# Patient Record
Sex: FEMALE | Race: WHITE | NOT HISPANIC OR LATINO | URBAN - METROPOLITAN AREA
[De-identification: names, ages, dates, MRNs, and addresses within clinical notes are randomized per-mention and may not be internally consistent; named-entity substitution may affect disease eponyms.]

---

## 2018-12-12 ENCOUNTER — OFFICE VISIT (OUTPATIENT)
Dept: FAMILY MEDICINE | Facility: CLINIC | Age: 33
End: 2018-12-12
Payer: COMMERCIAL

## 2018-12-12 VITALS
TEMPERATURE: 98.1 F | WEIGHT: 179 LBS | DIASTOLIC BLOOD PRESSURE: 80 MMHG | BODY MASS INDEX: 30.56 KG/M2 | RESPIRATION RATE: 18 BRPM | OXYGEN SATURATION: 98 % | SYSTOLIC BLOOD PRESSURE: 120 MMHG | HEIGHT: 64 IN | HEART RATE: 72 BPM

## 2018-12-12 DIAGNOSIS — K21.9 GASTROESOPHAGEAL REFLUX DISEASE, ESOPHAGITIS PRESENCE NOT SPECIFIED: ICD-10-CM

## 2018-12-12 DIAGNOSIS — Z00.00 PHYSICAL EXAM: Primary | ICD-10-CM

## 2018-12-12 PROCEDURE — 99385 PREV VISIT NEW AGE 18-39: CPT | Performed by: FAMILY MEDICINE

## 2018-12-12 RX ORDER — OMEPRAZOLE 20 MG/1
20 CAPSULE, DELAYED RELEASE ORAL
COMMUNITY
End: 2019-03-25

## 2018-12-12 ASSESSMENT — ENCOUNTER SYMPTOMS
NUMBNESS: 0
PHOTOPHOBIA: 0
DYSURIA: 0
NERVOUS/ANXIOUS: 0
CHEST TIGHTNESS: 0
NECK PAIN: 0
NAUSEA: 0
SINUS PAIN: 0
JOINT SWELLING: 0
WHEEZING: 0
LIGHT-HEADEDNESS: 0
HEMATURIA: 0
TROUBLE SWALLOWING: 0
PALPITATIONS: 0
FATIGUE: 0
DIFFICULTY URINATING: 0
EYE DISCHARGE: 0
SLEEP DISTURBANCE: 0
ACTIVITY CHANGE: 0
VOICE CHANGE: 0
EYE REDNESS: 0
ABDOMINAL PAIN: 0
UNEXPECTED WEIGHT CHANGE: 0
ROS GI COMMENTS: HEARTBURN
VOMITING: 0
APNEA: 0
FREQUENCY: 0
EYE PAIN: 0
ABDOMINAL DISTENTION: 0
DIZZINESS: 0
APPETITE CHANGE: 0
CONSTIPATION: 0
WEAKNESS: 0
HEADACHES: 0
BACK PAIN: 0
SHORTNESS OF BREATH: 0
RHINORRHEA: 0
DIARRHEA: 0
SORE THROAT: 0
COUGH: 0
BLOOD IN STOOL: 0

## 2018-12-12 NOTE — PROGRESS NOTES
"Subjective      Patient ID: Fatoumata De Los Santos is a 33 y.o. female.  1985      HPI  Pt presents for physical exam  Having issues with heartburn after having baby 2 months taking omeprazole  Nursing baby  Has history of anxiety was on Paxil previously  Off since 2015  Sees Gyn had pap 2018  The following have been reviewed and updated as appropriate in this visit:  Tobacco  Allergies  Meds  Problems  Med Hx  Surg Hx  Fam Hx       Review of Systems   Constitutional: Negative for activity change, appetite change, fatigue and unexpected weight change.   HENT: Negative for congestion, ear pain, hearing loss, rhinorrhea, sinus pain, sneezing, sore throat, trouble swallowing and voice change.    Eyes: Negative for photophobia, pain, discharge, redness and visual disturbance.   Respiratory: Negative for apnea, cough, chest tightness, shortness of breath and wheezing.    Cardiovascular: Negative for chest pain, palpitations and leg swelling.   Gastrointestinal: Negative for abdominal distention, abdominal pain, blood in stool, constipation, diarrhea, nausea and vomiting.        Heartburn     Endocrine: Negative for cold intolerance and heat intolerance.   Genitourinary: Negative for decreased urine volume, difficulty urinating, dysuria, frequency, hematuria, urgency, vaginal bleeding, vaginal discharge and vaginal pain.   Musculoskeletal: Negative for back pain, joint swelling and neck pain.   Skin: Negative for rash.   Neurological: Negative for dizziness, weakness, light-headedness, numbness and headaches.   Psychiatric/Behavioral: Negative for sleep disturbance. The patient is not nervous/anxious.        Objective     Vitals:    12/12/18 1141 12/12/18 1201   BP: 110/70 120/80   BP Location: Left upper arm    Patient Position: Sitting    Pulse: 72    Resp: 18    Temp: 36.7 °C (98.1 °F)    TempSrc: Oral    SpO2: 98%    Weight: 81.2 kg (179 lb)    Height: 1.632 m (5' 4.25\")      Body mass index is 30.49 " kg/m².    Physical Exam   Constitutional: She is oriented to person, place, and time. She appears well-developed and well-nourished.   HENT:   Head: Normocephalic.   Right Ear: Tympanic membrane, external ear and ear canal normal.   Left Ear: Tympanic membrane, external ear and ear canal normal.   Nose: Nose normal.   Eyes: Conjunctivae and EOM are normal. Pupils are equal, round, and reactive to light.   Neck: No JVD present.   Cardiovascular: Normal rate and regular rhythm.    No murmur heard.  Pulmonary/Chest: Effort normal and breath sounds normal. No respiratory distress. She has no wheezes.   Abdominal: Soft. Bowel sounds are normal. She exhibits no distension and no mass. There is no tenderness. There is no rebound and no guarding. No hernia.   Musculoskeletal: She exhibits no edema or tenderness.   Lymphadenopathy:     She has no cervical adenopathy.   Neurological: She is alert and oriented to person, place, and time. She displays normal reflexes. No sensory deficit.   Skin: Skin is warm and dry.   Psychiatric: She has a normal mood and affect.   Nursing note and vitals reviewed.      Assessment/Plan   Problem List Items Addressed This Visit     None      Visit Diagnoses     Physical exam    -  Primary    diet/exercise  up to date on Pap  check labs    Relevant Orders    Lipid panel    TSH w reflex FT4    Comprehensive metabolic panel    CBC and Differential    Gastroesophageal reflux disease, esophagitis presence not specified        omeprazole and monitor    Relevant Medications    omeprazole (PriLOSEC) 20 mg capsule          Current Outpatient Prescriptions:   •  omeprazole (PriLOSEC) 20 mg capsule, Take 20 mg by mouth daily before breakfast., Disp: , Rfl:   •  prenatal vits96/iron fum/folic (PRE-THIAGO VITAMIN) 27 mg iron- 800 mcg tablet, Take by mouth daily., Disp: , Rfl:   Return in about 1 year (around 2019) for PE.  PVU

## 2018-12-25 LAB
ALBUMIN SERPL-MCNC: 4.4 G/DL (ref 3.6–5.1)
ALBUMIN/GLOB SERPL: 1.5 (CALC) (ref 1–2.5)
ALP SERPL-CCNC: 66 U/L (ref 33–115)
ALT SERPL-CCNC: 33 U/L (ref 6–29)
AST SERPL-CCNC: 22 U/L (ref 10–30)
BASOPHILS # BLD AUTO: 41 CELLS/UL (ref 0–200)
BASOPHILS NFR BLD AUTO: 0.8 %
BILIRUB SERPL-MCNC: 0.6 MG/DL (ref 0.2–1.2)
BUN SERPL-MCNC: 18 MG/DL (ref 7–25)
BUN/CREAT SERPL: ABNORMAL (CALC) (ref 6–22)
CALCIUM SERPL-MCNC: 9.1 MG/DL (ref 8.6–10.2)
CHLORIDE SERPL-SCNC: 107 MMOL/L (ref 98–110)
CHOLEST SERPL-MCNC: 231 MG/DL
CHOLEST/HDLC SERPL: 4 (CALC)
CO2 SERPL-SCNC: 26 MMOL/L (ref 20–32)
CREAT SERPL-MCNC: 0.65 MG/DL (ref 0.5–1.1)
EOSINOPHIL # BLD AUTO: 153 CELLS/UL (ref 15–500)
EOSINOPHIL NFR BLD AUTO: 3 %
ERYTHROCYTE [DISTWIDTH] IN BLOOD BY AUTOMATED COUNT: 13.5 % (ref 11–15)
GFR SERPL CREATININE-BSD FRML MDRD: 117 ML/MIN/1.73M2
GLOBULIN SER CALC-MCNC: 3 G/DL (CALC) (ref 1.9–3.7)
GLUCOSE SERPL-MCNC: 78 MG/DL (ref 65–99)
HCT VFR BLD AUTO: 41.4 % (ref 35–45)
HDLC SERPL-MCNC: 58 MG/DL
HGB BLD-MCNC: 14.2 G/DL (ref 11.7–15.5)
LDLC SERPL CALC-MCNC: 153 MG/DL (CALC)
LYMPHOCYTES # BLD AUTO: 1795 CELLS/UL (ref 850–3900)
LYMPHOCYTES NFR BLD AUTO: 35.2 %
MCH RBC QN AUTO: 29.8 PG (ref 27–33)
MCHC RBC AUTO-ENTMCNC: 34.3 G/DL (ref 32–36)
MCV RBC AUTO: 86.8 FL (ref 80–100)
MONOCYTES # BLD AUTO: 332 CELLS/UL (ref 200–950)
MONOCYTES NFR BLD AUTO: 6.5 %
NEUTROPHILS # BLD AUTO: 2780 CELLS/UL (ref 1500–7800)
NEUTROPHILS NFR BLD AUTO: 54.5 %
NONHDLC SERPL-MCNC: 173 MG/DL (CALC)
PLATELET # BLD AUTO: 262 THOUSAND/UL (ref 140–400)
PMV BLD REES-ECKER: 8.9 FL (ref 7.5–12.5)
POTASSIUM SERPL-SCNC: 4.2 MMOL/L (ref 3.5–5.3)
PROT SERPL-MCNC: 7.4 G/DL (ref 6.1–8.1)
RBC # BLD AUTO: 4.77 MILLION/UL (ref 3.8–5.1)
SODIUM SERPL-SCNC: 141 MMOL/L (ref 135–146)
TRIGL SERPL-MCNC: 92 MG/DL
TSH SERPL-ACNC: 0.79 MIU/L
WBC # BLD AUTO: 5.1 THOUSAND/UL (ref 3.8–10.8)

## 2019-03-25 ENCOUNTER — OFFICE VISIT (OUTPATIENT)
Dept: FAMILY MEDICINE | Facility: CLINIC | Age: 34
End: 2019-03-25
Payer: COMMERCIAL

## 2019-03-25 VITALS
BODY MASS INDEX: 28.27 KG/M2 | TEMPERATURE: 98.6 F | HEART RATE: 81 BPM | WEIGHT: 166 LBS | DIASTOLIC BLOOD PRESSURE: 60 MMHG | SYSTOLIC BLOOD PRESSURE: 98 MMHG | OXYGEN SATURATION: 98 % | RESPIRATION RATE: 20 BRPM

## 2019-03-25 DIAGNOSIS — R10.13 EPIGASTRIC PAIN: Primary | ICD-10-CM

## 2019-03-25 DIAGNOSIS — R11.0 NAUSEA: ICD-10-CM

## 2019-03-25 PROCEDURE — 99213 OFFICE O/P EST LOW 20 MIN: CPT | Performed by: FAMILY MEDICINE

## 2019-03-25 ASSESSMENT — ENCOUNTER SYMPTOMS
HEADACHES: 0
CONSTIPATION: 0
COUGH: 0
ABDOMINAL PAIN: 1
SINUS PRESSURE: 0
DIARRHEA: 0
DIZZINESS: 0
FEVER: 0
ABDOMINAL DISTENTION: 0
VOMITING: 0
SORE THROAT: 0
NAUSEA: 1
WHEEZING: 0
TROUBLE SWALLOWING: 0
SINUS PAIN: 0
SHORTNESS OF BREATH: 0
FATIGUE: 0
ACTIVITY CHANGE: 0
RHINORRHEA: 0

## 2019-03-25 NOTE — PROGRESS NOTES
Subjective      Patient ID: Fatoumata De Los Santos is a 33 y.o. female.  1985      Abdominal Pain   This is a new problem. The current episode started more than 1 month ago. The problem occurs intermittently. The pain is located in the epigastric region and RUQ. Pain radiation: mid upper thoracic. Associated symptoms include nausea. Pertinent negatives include no constipation, diarrhea, fever, headaches or vomiting. Nothing aggravates the pain. The pain is relieved by nothing. Treatments tried: omeprazole. The treatment provided no relief.   stopped omeprazole because it was not helping  The following have been reviewed and updated as appropriate in this visit:  Allergies  Meds  Problems       Review of Systems   Constitutional: Negative for activity change, fatigue and fever.   HENT: Negative for congestion, ear pain, postnasal drip, rhinorrhea, sinus pain, sinus pressure, sore throat and trouble swallowing.    Respiratory: Negative for cough, shortness of breath and wheezing.    Cardiovascular: Negative for chest pain.   Gastrointestinal: Positive for abdominal pain and nausea. Negative for abdominal distention, constipation, diarrhea and vomiting.   Neurological: Negative for dizziness and headaches.       Objective     Vitals:    03/25/19 0813   BP: 98/60   BP Location: Left upper arm   Patient Position: Sitting   Pulse: 81   Resp: 20   Temp: 37 °C (98.6 °F)   TempSrc: Oral   SpO2: 98%   Weight: 75.3 kg (166 lb)     Body mass index is 28.27 kg/m².    Physical Exam   Constitutional: She is oriented to person, place, and time. She appears well-developed and well-nourished.   Cardiovascular: Normal rate and regular rhythm.    No murmur heard.  Pulmonary/Chest: Effort normal. She has no wheezes.   Abdominal: Soft. There is tenderness in the epigastric area. There is no rebound and no guarding.   Neurological: She is alert and oriented to person, place, and time.   Psychiatric: She has a normal mood and affect.    Nursing note and vitals reviewed.      Assessment/Plan   Diagnoses and all orders for this visit:    Epigastric pain (Primary)  Comments:  check US if negative and symptoms persist check HIDA  Orders:  -     ULTRASOUND ABDOMEN COMPLETE; Future    Nausea  Comments:  check US if negative and symptoms persist check HIDA  Orders:  -     ULTRASOUND ABDOMEN COMPLETE; Future    await US  No Follow-up on file.  PVU

## 2019-04-05 ENCOUNTER — HOSPITAL ENCOUNTER (OUTPATIENT)
Dept: RADIOLOGY | Age: 34
Discharge: HOME | End: 2019-04-05
Attending: FAMILY MEDICINE
Payer: COMMERCIAL

## 2019-04-05 DIAGNOSIS — R10.13 EPIGASTRIC PAIN: ICD-10-CM

## 2019-04-05 DIAGNOSIS — R11.0 NAUSEA: ICD-10-CM

## 2019-04-05 PROCEDURE — 76700 US EXAM ABDOM COMPLETE: CPT

## 2019-05-13 ENCOUNTER — OFFICE VISIT (OUTPATIENT)
Dept: FAMILY MEDICINE | Facility: CLINIC | Age: 34
End: 2019-05-13
Payer: COMMERCIAL

## 2019-05-13 VITALS
RESPIRATION RATE: 16 BRPM | HEART RATE: 68 BPM | BODY MASS INDEX: 28.44 KG/M2 | SYSTOLIC BLOOD PRESSURE: 110 MMHG | DIASTOLIC BLOOD PRESSURE: 72 MMHG | OXYGEN SATURATION: 98 % | TEMPERATURE: 98.6 F | WEIGHT: 167 LBS

## 2019-05-13 DIAGNOSIS — R00.2 HEART PALPITATIONS: Primary | ICD-10-CM

## 2019-05-13 PROCEDURE — 93000 ELECTROCARDIOGRAM COMPLETE: CPT | Performed by: FAMILY MEDICINE

## 2019-05-13 PROCEDURE — 99213 OFFICE O/P EST LOW 20 MIN: CPT | Performed by: FAMILY MEDICINE

## 2019-05-13 RX ORDER — CETIRIZINE HYDROCHLORIDE 10 MG/1
10 TABLET ORAL AS NEEDED
COMMUNITY

## 2019-05-13 ASSESSMENT — ENCOUNTER SYMPTOMS
VOMITING: 0
DIZZINESS: 0
CONSTIPATION: 0
FREQUENCY: 0
APPETITE CHANGE: 0
WHEEZING: 0
NUMBNESS: 0
PALPITATIONS: 1
SHORTNESS OF BREATH: 0
NAUSEA: 0
FATIGUE: 0
DIFFICULTY URINATING: 0
WEAKNESS: 0
ABDOMINAL DISTENTION: 0
DIARRHEA: 0
COUGH: 0
ABDOMINAL PAIN: 0
ACTIVITY CHANGE: 0
LIGHT-HEADEDNESS: 0

## 2019-05-13 NOTE — PROGRESS NOTES
Subjective      Patient ID: Fatoumata De Los Santos is a 33 y.o. female.  1985      HPI  Pt presents because of racing heart.  Seemed to occur after eating sometimes notices on watch her heart rate is 108 even at rest.  No chest pain or SOB    The following have been reviewed and updated as appropriate in this visit:  Allergies  Meds  Problems       Review of Systems   Constitutional: Negative for activity change, appetite change and fatigue.   Respiratory: Negative for cough, shortness of breath and wheezing.    Cardiovascular: Positive for palpitations. Negative for chest pain and leg swelling.   Gastrointestinal: Negative for abdominal distention, abdominal pain, constipation, diarrhea, nausea and vomiting.   Endocrine: Negative for cold intolerance and heat intolerance.   Genitourinary: Negative for difficulty urinating and frequency.   Skin: Negative for rash.   Neurological: Negative for dizziness, weakness, light-headedness and numbness.       Objective     Vitals:    05/13/19 1338   BP: 110/72   BP Location: Left upper arm   Patient Position: Sitting   Pulse: 68   Resp: 16   Temp: 37 °C (98.6 °F)   TempSrc: Oral   SpO2: 98%   Weight: 75.8 kg (167 lb)     Body mass index is 28.44 kg/m².    Physical Exam   Constitutional: She is oriented to person, place, and time. She appears well-developed and well-nourished.   Neck: No JVD present. Carotid bruit is not present.   Cardiovascular: Normal rate, regular rhythm and normal heart sounds.    No murmur heard.  Pulmonary/Chest: Effort normal and breath sounds normal. No respiratory distress. She has no wheezes.   Abdominal: Soft. Bowel sounds are normal. She exhibits no distension and no mass. There is no tenderness. There is no rebound and no guarding. No hernia.   Musculoskeletal: She exhibits no edema or tenderness.   Lymphadenopathy:     She has no cervical adenopathy.   Neurological: She is alert and oriented to person, place, and time.   Skin: Skin is warm  and dry.   Psychiatric: She has a normal mood and affect.   Nursing note and vitals reviewed.      Assessment/Plan   Diagnoses and all orders for this visit:    Heart palpitations (Primary)  Comments:  ekg done and reviewed nsr no acute changes  will check holter monitor  Orders:  -     ECG 12 LEAD OFFICE PERFORMED  -     Holter monitor - 24 hour; Future    Return if symptoms worsen or fail to improve.  PVU

## 2019-09-25 ENCOUNTER — OFFICE VISIT (OUTPATIENT)
Dept: FAMILY MEDICINE | Facility: CLINIC | Age: 34
End: 2019-09-25
Payer: COMMERCIAL

## 2019-09-25 VITALS
TEMPERATURE: 98.6 F | DIASTOLIC BLOOD PRESSURE: 68 MMHG | RESPIRATION RATE: 16 BRPM | HEART RATE: 76 BPM | WEIGHT: 165 LBS | OXYGEN SATURATION: 98 % | BODY MASS INDEX: 28.17 KG/M2 | HEIGHT: 64 IN | SYSTOLIC BLOOD PRESSURE: 90 MMHG

## 2019-09-25 DIAGNOSIS — R19.8 ABDOMINAL FULLNESS: ICD-10-CM

## 2019-09-25 DIAGNOSIS — R10.13 EPIGASTRIC PAIN: Primary | ICD-10-CM

## 2019-09-25 PROCEDURE — 99213 OFFICE O/P EST LOW 20 MIN: CPT | Performed by: FAMILY MEDICINE

## 2019-09-25 ASSESSMENT — ENCOUNTER SYMPTOMS
CONSTIPATION: 0
SHORTNESS OF BREATH: 0
ABDOMINAL PAIN: 1
DIZZINESS: 0
FATIGUE: 0
WEAKNESS: 0
APPETITE CHANGE: 0
DIARRHEA: 0
PALPITATIONS: 0
FREQUENCY: 0
VOMITING: 0
ABDOMINAL DISTENTION: 0
DIFFICULTY URINATING: 0
ACTIVITY CHANGE: 0
NAUSEA: 0
WHEEZING: 0
NUMBNESS: 0
COUGH: 0
LIGHT-HEADEDNESS: 0

## 2019-09-25 NOTE — PROGRESS NOTES
"Subjective      Patient ID: Fatoumata De Los Santos is a 34 y.o. female.  1985      HPI  Still having pressure in epigastric region.  Feels like pressure up into chest  Having belching in am  Worse after eating  Comes and goes  Not having heart burn symptoms  Never had holter monitor because palpitations improved  The following have been reviewed and updated as appropriate in this visit:  Allergies  Meds  Problems       Review of Systems   Constitutional: Negative for activity change, appetite change and fatigue.   Respiratory: Negative for cough, shortness of breath and wheezing.    Cardiovascular: Negative for chest pain, palpitations and leg swelling.   Gastrointestinal: Positive for abdominal pain. Negative for abdominal distention, constipation, diarrhea, nausea and vomiting.        Fullness in abdomen   Endocrine: Negative for cold intolerance and heat intolerance.   Genitourinary: Negative for difficulty urinating and frequency.   Skin: Negative for rash.   Neurological: Negative for dizziness, weakness, light-headedness and numbness.       Objective     Vitals:    09/25/19 0757   BP: 90/68   BP Location: Left upper arm   Patient Position: Sitting   Pulse: 76   Resp: 16   Temp: 37 °C (98.6 °F)   TempSrc: Oral   SpO2: 98%   Weight: 74.8 kg (165 lb)   Height: 1.632 m (5' 4.25\")     Body mass index is 28.1 kg/m².    Physical Exam   Constitutional: She is oriented to person, place, and time. She appears well-developed and well-nourished.   Neck: No JVD present. Carotid bruit is not present.   Cardiovascular: Normal rate, regular rhythm and normal heart sounds.    No murmur heard.  Pulmonary/Chest: Effort normal and breath sounds normal. No respiratory distress. She has no wheezes.   Abdominal: Soft. Bowel sounds are normal. She exhibits no distension and no mass. There is tenderness in the epigastric area. There is no rebound and no guarding. No hernia.   Musculoskeletal: She exhibits no edema or tenderness. "   Lymphadenopathy:     She has no cervical adenopathy.   Neurological: She is alert and oriented to person, place, and time.   Skin: Skin is warm and dry.   Psychiatric: She has a normal mood and affect.   Nursing note and vitals reviewed.      Assessment/Plan   Diagnoses and all orders for this visit:    Epigastric pain (Primary)  Comments:  suspect may be hiatal hernia   will do barium swallow  Orders:  -     FLUOROSCOPY BARIUM SWALLOW; Future    Abdominal fullness  Comments:  suspect may be hiatal hernia   will do barium swallow  Orders:  -     FLUOROSCOPY BARIUM SWALLOW; Future      Return if symptoms worsen or fail to improve.  PVU

## 2019-09-25 NOTE — PATIENT INSTRUCTIONS
Patient Education     Hiatal Hernia    A hiatal hernia occurs when part of the stomach slides above the muscle that separates the abdomen from the chest (diaphragm). A person can be born with a hiatal hernia (congenital), or it may develop over time. In almost all cases of hiatal hernia, only the top part of the stomach pushes through the diaphragm.  Many people have a hiatal hernia with no symptoms. The larger the hernia, the more likely it is that you will have symptoms. In some cases, a hiatal hernia allows stomach acid to flow back into the tube that carries food from your mouth to your stomach (esophagus). This may cause heartburn symptoms. Severe heartburn symptoms may mean that you have developed a condition called gastroesophageal reflux disease (GERD).  What are the causes?  This condition is caused by a weakness in the opening (hiatus) where the esophagus passes through the diaphragm to attach to the upper part of the stomach. A person may be born with a weakness in the hiatus, or a weakness can develop over time.  What increases the risk?  This condition is more likely to develop in:  · Older people. Age is a major risk factor for a hiatal hernia, especially if you are over the age of 50.  · Pregnant women.  · People who are overweight.  · People who have frequent constipation.  What are the signs or symptoms?  Symptoms of this condition usually develop in the form of GERD symptoms. Symptoms include:  · Heartburn.  · Belching.  · Indigestion.  · Trouble swallowing.  · Coughing or wheezing.  · Sore throat.  · Hoarseness.  · Chest pain.  · Nausea and vomiting.  How is this diagnosed?  This condition may be diagnosed during testing for GERD. Tests that may be done include:  · X-rays of your stomach or chest.  · An upper gastrointestinal (GI) series. This is an X-ray exam of your GI tract that is taken after you swallow a chalky liquid that shows up clearly on the X-ray.  · Endoscopy. This is a procedure to  look into your stomach using a thin, flexible tube that has a tiny camera and light on the end of it.  How is this treated?  This condition may be treated by:  · Dietary and lifestyle changes to help reduce GERD symptoms.  · Medicines. These may include:  ? Over-the-counter antacids.  ? Medicines that make your stomach empty more quickly.  ? Medicines that block the production of stomach acid (H2 blockers).  ? Stronger medicines to reduce stomach acid (proton pump inhibitors).  · Surgery to repair the hernia, if other treatments are not helping.  If you have no symptoms, you may not need treatment.  Follow these instructions at home:  Lifestyle and activity  · Do not use any products that contain nicotine or tobacco, such as cigarettes and e-cigarettes. If you need help quitting, ask your health care provider.  · Try to achieve and maintain a healthy body weight.  · Avoid putting pressure on your abdomen. Anything that puts pressure on your abdomen increases the amount of acid that may be pushed up into your esophagus.  ? Avoid bending over, especially after eating.  ? Raise the head of your bed by putting blocks under the legs. This keeps your head and esophagus higher than your stomach.  ? Do not wear tight clothing around your chest or stomach.  ? Try not to strain when having a bowel movement, when urinating, or when lifting heavy objects.  Eating and drinking  · Avoid foods that can worsen GERD symptoms. These may include:  ? Fatty foods, like fried foods.  ? Citrus fruits, like oranges or lemon.  ? Other foods and drinks that contain acid, like orange juice or tomatoes.  ? Spicy food.  ? Chocolate.  · Eat frequent small meals instead of three large meals a day. This helps prevent your stomach from getting too full.  ? Eat slowly.  ? Do not lie down right after eating.  ? Do not eat 1-2 hours before bed.  · Do not drink beverages with caffeine. These include cola, coffee, cocoa, and tea.  · Do not drink  alcohol.  General instructions  · Take over-the-counter and prescription medicines only as told by your health care provider.  · Keep all follow-up visits as told by your health care provider. This is important.  Contact a health care provider if:  · Your symptoms are not controlled with medicines or lifestyle changes.  · You are having trouble swallowing.  · You have coughing or wheezing that will not go away.  Get help right away if:  · Your pain is getting worse.  · Your pain spreads to your arms, neck, jaw, teeth, or back.  · You have shortness of breath.  · You sweat for no reason.  · You feel sick to your stomach (nauseous) or you vomit.  · You vomit blood.  · You have bright red blood in your stools.  · You have black, tarry stools.  This information is not intended to replace advice given to you by your health care provider. Make sure you discuss any questions you have with your health care provider.  Document Released: 03/09/2005 Document Revised: 07/23/2018 Document Reviewed: 07/23/2018  [employer] Patient Education © 2019 [employer]

## 2019-12-16 ENCOUNTER — OFFICE VISIT (OUTPATIENT)
Dept: FAMILY MEDICINE | Facility: CLINIC | Age: 34
End: 2019-12-16
Payer: COMMERCIAL

## 2019-12-16 ENCOUNTER — TELEPHONE (OUTPATIENT)
Dept: FAMILY MEDICINE | Facility: CLINIC | Age: 34
End: 2019-12-16

## 2019-12-16 VITALS
HEART RATE: 83 BPM | TEMPERATURE: 98.7 F | BODY MASS INDEX: 29.19 KG/M2 | WEIGHT: 171 LBS | HEIGHT: 64 IN | OXYGEN SATURATION: 98 % | SYSTOLIC BLOOD PRESSURE: 120 MMHG | RESPIRATION RATE: 16 BRPM | DIASTOLIC BLOOD PRESSURE: 74 MMHG

## 2019-12-16 DIAGNOSIS — R10.13 EPIGASTRIC PAIN: ICD-10-CM

## 2019-12-16 DIAGNOSIS — Z00.00 PHYSICAL EXAM: Primary | ICD-10-CM

## 2019-12-16 PROCEDURE — 99395 PREV VISIT EST AGE 18-39: CPT | Performed by: FAMILY MEDICINE

## 2019-12-16 ASSESSMENT — ENCOUNTER SYMPTOMS
TROUBLE SWALLOWING: 0
ACTIVITY CHANGE: 0
PALPITATIONS: 0
APPETITE CHANGE: 0
LIGHT-HEADEDNESS: 0
WEAKNESS: 0
ABDOMINAL PAIN: 0
SORE THROAT: 0
NUMBNESS: 0
SLEEP DISTURBANCE: 0
BLOOD IN STOOL: 0
SINUS PAIN: 0
NECK PAIN: 0
DYSURIA: 0
UNEXPECTED WEIGHT CHANGE: 0
ABDOMINAL DISTENTION: 0
FREQUENCY: 0
VOICE CHANGE: 0
NERVOUS/ANXIOUS: 0
EYE PAIN: 0
EYE REDNESS: 0
WHEEZING: 0
CHEST TIGHTNESS: 0
DIZZINESS: 0
JOINT SWELLING: 0
BACK PAIN: 0
CONSTIPATION: 0
HEMATURIA: 0
FATIGUE: 0
APNEA: 0
DIFFICULTY URINATING: 0
RHINORRHEA: 0
NAUSEA: 0
DIARRHEA: 0
PHOTOPHOBIA: 0
VOMITING: 0
EYE DISCHARGE: 0
SHORTNESS OF BREATH: 0
HEADACHES: 0
COUGH: 0

## 2019-12-16 NOTE — PROGRESS NOTES
"Subjective      Patient ID: Fatoumata De Los Santos is a 34 y.o. female.  1985      HPI  Pt presents for physical exam  States epigastric pain is better but she has swallowing scheduled tomorrow  No palpitations,  No chest pain  Sees GYN    [facility]  The following have been reviewed and updated as appropriate in this visit:  Tobacco  Allergies  Meds  Problems  Med Hx  Surg Hx  Fam Hx  Soc Hx        Review of Systems   Constitutional: Negative for activity change, appetite change, fatigue and unexpected weight change.   HENT: Negative for congestion, ear pain, hearing loss, rhinorrhea, sinus pain, sneezing, sore throat, trouble swallowing and voice change.    Eyes: Negative for photophobia, pain, discharge, redness and visual disturbance.   Respiratory: Negative for apnea, cough, chest tightness, shortness of breath and wheezing.    Cardiovascular: Negative for chest pain, palpitations and leg swelling.   Gastrointestinal: Negative for abdominal distention, abdominal pain, blood in stool, constipation, diarrhea, nausea and vomiting.   Endocrine: Negative for cold intolerance and heat intolerance.   Genitourinary: Negative for decreased urine volume, difficulty urinating, dysuria, frequency, hematuria, urgency, vaginal bleeding, vaginal discharge and vaginal pain.   Musculoskeletal: Negative for back pain, joint swelling and neck pain.   Skin: Negative for rash.   Neurological: Negative for dizziness, weakness, light-headedness, numbness and headaches.   Psychiatric/Behavioral: Negative for sleep disturbance. The patient is not nervous/anxious.        Objective     Vitals:    12/16/19 1500 12/16/19 1509   BP: 114/70 120/74   BP Location: Left upper arm    Patient Position: Sitting    Pulse: 83    Resp: 16    Temp: 37.1 °C (98.7 °F)    TempSrc: Oral    SpO2: 98%    Weight: 77.6 kg (171 lb)    Height: 1.632 m (5' 4.25\")      Body mass index is 29.12 kg/m².    Physical Exam   Constitutional: She is " oriented to person, place, and time. She appears well-developed and well-nourished.   HENT:   Head: Normocephalic.   Right Ear: Tympanic membrane, external ear and ear canal normal.   Left Ear: Tympanic membrane, external ear and ear canal normal.   Nose: Nose normal.   Eyes: Pupils are equal, round, and reactive to light. Conjunctivae and EOM are normal.   Neck: No JVD present.   Cardiovascular: Normal rate and regular rhythm.   No murmur heard.  Pulmonary/Chest: Effort normal and breath sounds normal. No respiratory distress. She has no wheezes.   Abdominal: Soft. Bowel sounds are normal. She exhibits no distension and no mass. There is no tenderness. There is no rebound and no guarding. No hernia.   Musculoskeletal: She exhibits no edema or tenderness.   Lymphadenopathy:     She has no cervical adenopathy.   Neurological: She is alert and oriented to person, place, and time. She displays normal reflexes. No sensory deficit.   Skin: Skin is warm and dry.   Psychiatric: She has a normal mood and affect.   Nursing note and vitals reviewed.      Assessment/Plan   Diagnoses and all orders for this visit:    Physical exam (Primary)  -     Lipid panel; Future  -     TSH w reflex FT4; Future  -     Comprehensive metabolic panel; Future  -     CBC and Differential; Future    Epigastric pain  Comments:  has swallowing study scheduled for tomorrow        Current Outpatient Medications:   •  copper (PARAGARD) 380 square mm intrauterine device intrauterine device, 1 each by intrauterine route once., Disp: , Rfl:   •  cetirizine (ZyrTEC) 10 mg tablet, Take 10 mg by mouth daily., Disp: , Rfl:   Return in about 1 year (around 12/16/2020) for PE.  PVU

## 2019-12-17 ENCOUNTER — HOSPITAL ENCOUNTER (OUTPATIENT)
Dept: RADIOLOGY | Facility: HOSPITAL | Age: 34
Discharge: HOME | End: 2019-12-17
Attending: FAMILY MEDICINE
Payer: COMMERCIAL

## 2019-12-17 DIAGNOSIS — R19.8 ABDOMINAL FULLNESS: ICD-10-CM

## 2019-12-17 DIAGNOSIS — R10.13 EPIGASTRIC PAIN: ICD-10-CM

## 2019-12-17 PROCEDURE — 74220 X-RAY XM ESOPHAGUS 1CNTRST: CPT

## 2020-01-21 LAB
ALBUMIN SERPL-MCNC: 4.4 G/DL (ref 3.6–5.1)
ALBUMIN/GLOB SERPL: 1.5 (CALC) (ref 1–2.5)
ALP SERPL-CCNC: 41 U/L (ref 33–115)
ALT SERPL-CCNC: 10 U/L (ref 6–29)
AST SERPL-CCNC: 11 U/L (ref 10–30)
BASOPHILS # BLD AUTO: 21 CELLS/UL (ref 0–200)
BASOPHILS NFR BLD AUTO: 0.4 %
BILIRUB SERPL-MCNC: 0.5 MG/DL (ref 0.2–1.2)
BUN SERPL-MCNC: 12 MG/DL (ref 7–25)
BUN/CREAT SERPL: NORMAL (CALC) (ref 6–22)
CALCIUM SERPL-MCNC: 9.3 MG/DL (ref 8.6–10.2)
CHLORIDE SERPL-SCNC: 106 MMOL/L (ref 98–110)
CHOLEST SERPL-MCNC: 174 MG/DL
CHOLEST/HDLC SERPL: 4.8 (CALC)
CO2 SERPL-SCNC: 26 MMOL/L (ref 20–32)
CREAT SERPL-MCNC: 0.61 MG/DL (ref 0.5–1.1)
EOSINOPHIL # BLD AUTO: 109 CELLS/UL (ref 15–500)
EOSINOPHIL NFR BLD AUTO: 2.1 %
ERYTHROCYTE [DISTWIDTH] IN BLOOD BY AUTOMATED COUNT: 12.4 % (ref 11–15)
GLOBULIN SER CALC-MCNC: 2.9 G/DL (CALC) (ref 1.9–3.7)
GLUCOSE SERPL-MCNC: 84 MG/DL (ref 65–99)
HCT VFR BLD AUTO: 40.3 % (ref 35–45)
HDLC SERPL-MCNC: 36 MG/DL
HGB BLD-MCNC: 13.9 G/DL (ref 11.7–15.5)
LDLC SERPL CALC-MCNC: 119 MG/DL (CALC)
LYMPHOCYTES # BLD AUTO: 1477 CELLS/UL (ref 850–3900)
LYMPHOCYTES NFR BLD AUTO: 28.4 %
MCH RBC QN AUTO: 30.1 PG (ref 27–33)
MCHC RBC AUTO-ENTMCNC: 34.5 G/DL (ref 32–36)
MCV RBC AUTO: 87.2 FL (ref 80–100)
MONOCYTES # BLD AUTO: 359 CELLS/UL (ref 200–950)
MONOCYTES NFR BLD AUTO: 6.9 %
NEUTROPHILS # BLD AUTO: 3234 CELLS/UL (ref 1500–7800)
NEUTROPHILS NFR BLD AUTO: 62.2 %
NONHDLC SERPL-MCNC: 138 MG/DL (CALC)
PLATELET # BLD AUTO: 254 THOUSAND/UL (ref 140–400)
PMV BLD REES-ECKER: 9.2 FL (ref 7.5–12.5)
POTASSIUM SERPL-SCNC: 3.9 MMOL/L (ref 3.5–5.3)
PROT SERPL-MCNC: 7.3 G/DL (ref 6.1–8.1)
QUEST EGFR NON-AFR. AMERICAN: 118 ML/MIN/1.73M2
RBC # BLD AUTO: 4.62 MILLION/UL (ref 3.8–5.1)
SODIUM SERPL-SCNC: 140 MMOL/L (ref 135–146)
TRIGL SERPL-MCNC: 90 MG/DL
TSH SERPL-ACNC: 0.79 MIU/L
WBC # BLD AUTO: 5.2 THOUSAND/UL (ref 3.8–10.8)

## 2020-09-29 ENCOUNTER — OFFICE VISIT (OUTPATIENT)
Dept: FAMILY MEDICINE | Facility: CLINIC | Age: 35
End: 2020-09-29
Payer: COMMERCIAL

## 2020-09-29 ENCOUNTER — TELEPHONE (OUTPATIENT)
Dept: FAMILY MEDICINE | Facility: CLINIC | Age: 35
End: 2020-09-29

## 2020-09-29 VITALS
RESPIRATION RATE: 16 BRPM | HEIGHT: 64 IN | HEART RATE: 80 BPM | OXYGEN SATURATION: 98 % | WEIGHT: 181 LBS | DIASTOLIC BLOOD PRESSURE: 70 MMHG | TEMPERATURE: 98.7 F | SYSTOLIC BLOOD PRESSURE: 100 MMHG | BODY MASS INDEX: 30.9 KG/M2

## 2020-09-29 DIAGNOSIS — R09.89 THROAT TIGHTNESS: ICD-10-CM

## 2020-09-29 DIAGNOSIS — I45.9 SKIPPED HEART BEATS: Primary | ICD-10-CM

## 2020-09-29 PROCEDURE — 93000 ELECTROCARDIOGRAM COMPLETE: CPT | Performed by: FAMILY MEDICINE

## 2020-09-29 PROCEDURE — 99213 OFFICE O/P EST LOW 20 MIN: CPT | Performed by: FAMILY MEDICINE

## 2020-09-29 ASSESSMENT — ENCOUNTER SYMPTOMS
APPETITE CHANGE: 0
DIZZINESS: 0
COUGH: 0
ABDOMINAL DISTENTION: 0
ABDOMINAL PAIN: 0
VOMITING: 0
ACTIVITY CHANGE: 0
DIFFICULTY URINATING: 0
CONSTIPATION: 0
FATIGUE: 0
NUMBNESS: 0
LIGHT-HEADEDNESS: 0
SHORTNESS OF BREATH: 0
WHEEZING: 0
NAUSEA: 0
WEAKNESS: 0
FREQUENCY: 0
DIARRHEA: 0
PALPITATIONS: 0

## 2020-09-29 NOTE — PROGRESS NOTES
"      Subjective      Patient ID: Fatoumata De Los Santos is a 35 y.o. female.  1985      HPI  Feels like a tightness in throat,  After eating  Had episode on Sunday and then had it yesterday after lunch and supper  Had same thing today after lunch  Had it on and off for about 2 hours feels like it is skipping beat,   Took an antiacid   Captured on ekg on watch and showed skipped beat    The following have been reviewed and updated as appropriate in this visit:  Allergies  Meds  Problems  Fam Hx       Review of Systems   Constitutional: Negative for activity change, appetite change and fatigue.   Respiratory: Negative for cough, shortness of breath and wheezing.    Cardiovascular: Negative for chest pain, palpitations and leg swelling.        Skipped beat  Tightening in throat   Gastrointestinal: Negative for abdominal distention, abdominal pain, constipation, diarrhea, nausea and vomiting.   Endocrine: Negative for cold intolerance and heat intolerance.   Genitourinary: Negative for difficulty urinating and frequency.   Skin: Negative for rash.   Neurological: Negative for dizziness, weakness, light-headedness and numbness.       Objective     Vitals:    09/29/20 1824   BP: 100/70   BP Location: Left upper arm   Patient Position: Sitting   Pulse: 80   Resp: 16   Temp: 37.1 °C (98.7 °F)   TempSrc: Oral   SpO2: 98%   Weight: 82.1 kg (181 lb)   Height: 1.632 m (5' 4.25\")     Body mass index is 30.83 kg/m².    Physical Exam  Vitals signs and nursing note reviewed.   Constitutional:       Appearance: She is well-developed.   Neck:      Vascular: No carotid bruit or JVD.   Cardiovascular:      Rate and Rhythm: Normal rate and regular rhythm.      Heart sounds: Normal heart sounds. No murmur.   Pulmonary:      Effort: Pulmonary effort is normal. No respiratory distress.      Breath sounds: Normal breath sounds. No wheezing.   Abdominal:      General: Bowel sounds are normal. There is no distension.      Palpations: " Abdomen is soft. There is no mass.      Tenderness: There is no abdominal tenderness. There is no guarding or rebound.      Hernia: No hernia is present.   Musculoskeletal:         General: No tenderness.   Lymphadenopathy:      Cervical: No cervical adenopathy.   Skin:     General: Skin is warm and dry.   Neurological:      Mental Status: She is alert and oriented to person, place, and time.   Psychiatric:         Mood and Affect: Mood normal.         Assessment/Plan   Diagnoses and all orders for this visit:    Skipped heart beats (Primary)  Comments:  ekg done and reviewed  nsr no acute changes   will check holter, echo and thyroid functions  question related to reflux will restart omeprazole   Orders:  -     TSH; Future  -     T4, free; Future  -     T3; Future  -     Holter monitor - 24 hour; Future  -     Transthoracic echo (TTE) complete; Future  -     ECG 12 LEAD OFFICE PERFORMED    Throat tightness  Comments:  ekg done and reviewed  nsr no acute changes   will check holter, echo and thyroid functions  question related to reflux will restart omeprazole       To ER with increased symptoms chest pain or shortness of breath  Pt agreeable with plan and VU

## 2020-10-01 ENCOUNTER — HOSPITAL ENCOUNTER (OUTPATIENT)
Dept: CARDIOLOGY | Facility: CLINIC | Age: 35
Discharge: HOME | End: 2020-10-01
Attending: FAMILY MEDICINE
Payer: COMMERCIAL

## 2020-10-01 VITALS
SYSTOLIC BLOOD PRESSURE: 100 MMHG | DIASTOLIC BLOOD PRESSURE: 70 MMHG | BODY MASS INDEX: 30.9 KG/M2 | WEIGHT: 181 LBS | HEIGHT: 64 IN

## 2020-10-01 DIAGNOSIS — I45.9 SKIPPED HEART BEATS: ICD-10-CM

## 2020-10-01 LAB
AORTIC ROOT ANNULUS - M-MODE: 3.1 CM
BSA FOR ECHO PROCEDURE: 1.93 M2
E WAVE DECELERATION TIME: 256 MS
E/A RATIO: 0.9
E/E' RATIO: 7.2
E/LAT E' RATIO: 4.7
EDV (MM-TEICH): 103 CM3
EF (A4C): 61.2 %
EF (MM-TEICH): 84.5 %
ESV (BP): 29.7 CM3
ESV (MM-TEICH): 16 CM3
LA ESV (BP): 42.7 CM3
LA ESV INDEX (A2C): 26.37 CM3/M2
LA ESV INDEX (BP): 22.12 CM3/M2
LA/AORTA RATIO: 1.23
LAAS-AP2: 17.9 CM2
LAAS-AP4: 15.1 CM2
LAD 2D - M-MODE: 3.8 CM
LALD A4C: 5.14 CM
LALD A4C: 5.23 CM
LAV-S: 50.9 CM3
LEFT ATRIUM VOLUME INDEX: 18.45 CM3/M2
LEFT ATRIUM VOLUME: 35.6 CM3
LEFT VENTRICLE DIASTOLIC VOLUME INDEX: 37.65 CM3/M2
LEFT VENTRICLE DIASTOLIC VOLUME: 72.66 CM3
LEFT VENTRICLE SYSTOLIC VOLUME INDEX: 14.6 CM3/M2
LEFT VENTRICLE SYSTOLIC VOLUME: 28.17 CM3
LV ESV (APICAL 2 CHAMBER): 30.64 CM3
LVAD-AP4: 25.6 CM2
LVAS-AP2: 14.2 CM2
LVAS-AP4: 14.2 CM2
LVESVI(A2C): 15.88 CM3/M2
LVESVI(BP): 15.39 CM3/M2
LVLD-AP4: 7.49 CM
LVLS-AP2: 6.07 CM
LVLS-AP4: 6.26 CM
M MODE - INTERVENTRICULAR SEPTUM IN END DIASTOLE: 1 CM
M MODE - LEFT INTERNAL DIMENSION IN SYSTOLE: 2.19 CM
M MODE - LEFT VENTRICULAR INTERNAL DIMENSION IN DIASTOLE: 4.72 CM
M MODE - LEFT VENTRICULAR POSTERIOR WALL IN END DIASTOLE: 0.85 CM
M MODE - LEFT VENTRICULAR POSTERIOR WALL IN END DIASTOLE: 0.85 CM
MV E'TISSUE VEL-LAT: 0.17 M/S
MV E'TISSUE VEL-MED: 0.11 M/S
MV PEAK A VEL: 0.92 M/S
MV PEAK E VEL: 0.81 M/S

## 2020-10-01 PROCEDURE — 93306 TTE W/DOPPLER COMPLETE: CPT

## 2020-10-14 LAB
T3 SERPL-MCNC: 99 NG/DL (ref 76–181)
T4 FREE SERPL-MCNC: 1.1 NG/DL (ref 0.8–1.8)
TSH SERPL-ACNC: 0.74 MIU/L

## 2020-10-16 ENCOUNTER — TELEPHONE (OUTPATIENT)
Dept: SCHEDULING | Facility: CLINIC | Age: 35
End: 2020-10-16

## 2020-10-16 NOTE — TELEPHONE ENCOUNTER
New Patient Appointment Request    Name of caller: Fatoumata De Los Santos    Relationship to patient: Self    Diagnosis: PVCs    Referred by: Dr Mari Campa    Previous Cardiologist name and phone number: None    Best contact number: [phone]    Additional notes: Scheduled 10/19 11:20. Pre reg complete.

## 2020-10-19 ENCOUNTER — OFFICE VISIT (OUTPATIENT)
Dept: CARDIOLOGY | Facility: CLINIC | Age: 35
End: 2020-10-19
Payer: COMMERCIAL

## 2020-10-19 VITALS
OXYGEN SATURATION: 98 % | HEART RATE: 107 BPM | TEMPERATURE: 98.7 F | HEIGHT: 64 IN | DIASTOLIC BLOOD PRESSURE: 90 MMHG | WEIGHT: 180 LBS | RESPIRATION RATE: 16 BRPM | SYSTOLIC BLOOD PRESSURE: 120 MMHG | BODY MASS INDEX: 30.73 KG/M2

## 2020-10-19 DIAGNOSIS — I45.9 SKIPPED HEART BEATS: Primary | ICD-10-CM

## 2020-10-19 DIAGNOSIS — R00.2 PALPITATION: ICD-10-CM

## 2020-10-19 PROBLEM — K21.9 GASTROESOPHAGEAL REFLUX DISEASE WITHOUT ESOPHAGITIS: Status: ACTIVE | Noted: 2020-10-19

## 2020-10-19 PROCEDURE — 99203 OFFICE O/P NEW LOW 30 MIN: CPT | Performed by: INTERNAL MEDICINE

## 2020-10-19 RX ORDER — HYDROGEN PEROXIDE 3 %
20 SOLUTION, NON-ORAL MISCELLANEOUS
COMMUNITY
End: 2021-06-23

## 2020-10-19 NOTE — LETTER
"October 19, 2020     Mari Campa DO  [address] [place] [address]    Patient: Fatoumata De Los Santos  YOB: 1985  Date of Visit: 10/19/2020      Dear Dr. Gretchen Campa:    Thank you for referring Fatoumata De Los Santos to me for evaluation. Below are my notes for this consultation.    If you have questions, please do not hesitate to call me. I look forward to following your patient along with you.         Sincerely,        Viktoriya Lopez MD        CC: No Recipients  Viktoriya Lopez MD  10/19/2020 11:45 AM  Signed                       Cardiology Consult/New Patient     Patient ID: Fatoumata De Los Santos 35 y.o. female. 1985  PCP: Mari Alberts DO   History Present Illness     HPI:       Dear Mari,    On October 19, 2020 I met Yaneli De Los Santos in the office for the first time.  She is a 35-year-old female with a history of reflux.  Over the past several weeks she has noted a sensation of skipping\".  She feels it in her throat.  It happens daily and is not completely nonpredictable.  She has been able to times of palpitations with abnormal beats on her apple phone.  She ultimately saw you for advice.  Her EKG was normal.  An echocardiogram was done on October 1 which showed a normal ejection fraction at 60 to 65% and no significant valve disease.  A Holter monitor was done through [facility].  She showed me the report.  During that time she had 9 PVCs.  She did have occasional palpitations, however the report states that they were not associated with PVCs.  Thyroid function studies were also normal.    She has no chest pain, shortness of breath, peripheral edema or syncope.  She recently got a spin bike.  She has been afraid to continue working out on this because of her palpitations.  She has not noted any palpitations with exercise.    She drinks 2 cups of coffee per day.  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  She does not smoke and drinks " alcohol on rare occasion.  She does think the PVCs are occasionally associated with food intake.    Her mother has sick sinus syndrome and does have a pacemaker in place.  She also has a dilated aortic root and orthostatic hypotension.    She is  with 2 children.  She has had 2  sections.  One was complicated with infection.  During her second pregnancy, 3 days before she delivered she developed fever and ultimately had acute appendicitis.  At that point she had an appendectomy as well as her second .    She denies hypertension, diabetes, liver disease or kidney disease.  She has no lung disease.  She is allergic to penicillin.    She does not smoke.  She drinks alcohol on rare occasion.    The rest of her review of systems is completely negative.    Past History     Past Medical History:   Diagnosis Date   • Anxiety      Past Surgical History:   Procedure Laterality Date   • APPENDECTOMY     •  SECTION      x2     Social History     Tobacco Use   • Smoking status: Never Smoker   • Smokeless tobacco: Never Used   Substance Use Topics   • Alcohol use: Yes     Comment: occasional   • Drug use: No     Family History   Problem Relation Age of Onset   • Hyperlipidemia Biological Mother    • Diabetes Biological Mother    • Osteoporosis Biological Mother    • Heart disease Biological Mother    • Leukemia Biological Father    • Lymphoma Biological Father    • Hypertension Biological Father    • Heart attack Maternal Grandmother    • Heart attack Maternal Grandfather      Allergies/Medications   Allergies: Penicillin g    Current Medications:   Outpatient Encounter Medications as of 10/19/2020:   •  cetirizine (ZyrTEC) 10 mg tablet, Take 10 mg by mouth daily.  •  copper (PARAGARD) 380 square mm intrauterine device intrauterine device, 1 each by intrauterine route once.  •  esomeprazole (NexIUM) 20 mg capsule, Take 20 mg by mouth daily before breakfast.     ROS   The rest of her review of  "systems is completely negative.    Vitals:    10/19/20 1125   BP: 120/90   BP Location: Right upper arm   Patient Position: Sitting   Pulse: (!) 107   Resp: 16   Temp: 37.1 °C (98.7 °F)   TempSrc: Oral   SpO2: 98%   Weight: 81.6 kg (180 lb)   Height: 1.632 m (5' 4.25\")     Physical Exam   She is comfortable but somewhat anxious.  Skin is warm and dry.  Conjunctiva are pink.  Blood pressure is 120/90.  Heart rate was 107 and regular.  No JVD or HJR.  Chest is clear.  Regular rhythm.  Normal S1 and S2.  No murmurs or gallops noted.  Abdomen is soft and obese with good bowel sounds.  No organomegaly.  No clubbing, cyanosis, or edema.  Good peripheral pulses.  No rash.  No obvious musculoskeletal deformity.  Labs/ Imaging/Procedures   Labs  Lab Results   Component Value Date    CHOL 174 01/20/2020    CHOL 231 (H) 12/24/2018     Lab Results   Component Value Date    HDL 36 (L) 01/20/2020    HDL 58 12/24/2018     Lab Results   Component Value Date    LDLCALC 119 (H) 01/20/2020    LDLCALC 153 (H) 12/24/2018     Lab Results   Component Value Date    TRIG 90 01/20/2020    TRIG 92 12/24/2018     Lab Results   Component Value Date    WBC 5.2 01/20/2020    HGB 13.9 01/20/2020    HCT 40.3 01/20/2020    MCV 87.2 01/20/2020     01/20/2020     Lab Results   Component Value Date    GLUCOSE 84 01/20/2020    CALCIUM 9.3 01/20/2020     01/20/2020    K 3.9 01/20/2020    CO2 26 01/20/2020     01/20/2020    BUN 12 01/20/2020    CREATININE 0.61 01/20/2020     Echo:   An echo done on 10 1 revealed normal left-ventricular function and no significant valve disease    EKG  Done on 9/29/2020 was within normal limits  Assessment/Plan     Problem List Items Addressed This Visit        Circulatory    Palpitation      Other Visit Diagnoses     Skipped heart beats    -  Primary    Relevant Orders    ECG 12 LEAD OFFICE PERFORMED          Impression:     Yaneli has rare symptomatic PVCs.  Even though they did not show up on her " Holter, she showed me how she tracked them on her apple phone, and they are clearly PVCs.  I had a lengthy discussion with her about whether she was having so many that she required treatment.  With a structurally normal heart they are benign especially since they do not happen with activity.  At this point time she would like simple observation.  She understands that if they should happen with exercise, she needs to contact me.  I have encouraged her to start doing more aerobic activity.  I have also suggested that she cut back on her coffee a bit.    Her blood pressure today as well as her heart rate were up.  She attributes that to her being somewhat anxious.  I would keep an eye on this at her future appointments.    Now, the mainstay of treatment will be simple reassurance.  At this point I will discharge her back to your care.    Thank you for allowing me to participate in the care of this patient.  I hope this information is helpful.  Please do not hesitate to contact me with any questions or concerns.      Viktoriya Lopez MD FACC, FACP  10/19/2020

## 2020-10-19 NOTE — PROGRESS NOTES
"                     Cardiology Consult/New Patient     Patient ID: Fatoumata De Los Santos 35 y.o. female. 1985  PCP: Mari Alberts DO   History Present Illness     HPI:       Dear Mari,    On 2020 I met Yaneli De Los Santos in the office for the first time.  She is a 35-year-old female with a history of reflux.  Over the past several weeks she has noted a sensation of skipping\".  She feels it in her throat.  It happens daily and is not completely nonpredictable.  She has been able to times of palpitations with abnormal beats on her apple phone.  She ultimately saw you for advice.  Her EKG was normal.  An echocardiogram was done on  which showed a normal ejection fraction at 60 to 65% and no significant valve disease.  A Holter monitor was done through [facility]  She showed me the report.  During that time she had 9 PVCs.  She did have occasional palpitations, however the report states that they were not associated with PVCs.  Thyroid function studies were also normal.    She has no chest pain, shortness of breath, peripheral edema or syncope.  She recently got a spin bike.  She has been afraid to continue working out on this because of her palpitations.  She has not noted any palpitations with exercise.    She drinks 2 cups of coffee per day.  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  She does not smoke and drinks alcohol on rare occasion.  She does think the PVCs are occasionally associated with food intake.    Her mother has sick sinus syndrome and does have a pacemaker in place.  She also has a dilated aortic root and orthostatic hypotension.    She is  with 2 children.  She has had 2  sections.  One was complicated with infection.  During her second pregnancy, 3 days before she delivered she developed fever and ultimately had acute appendicitis.  At that point she had an appendectomy as well as her second .    She denies hypertension, " "diabetes, liver disease or kidney disease.  She has no lung disease.  She is allergic to penicillin.    She does not smoke.  She drinks alcohol on rare occasion.    The rest of her review of systems is completely negative.    Past History     Past Medical History:   Diagnosis Date   • Anxiety      Past Surgical History:   Procedure Laterality Date   • APPENDECTOMY     •  SECTION      x2     Social History     Tobacco Use   • Smoking status: Never Smoker   • Smokeless tobacco: Never Used   Substance Use Topics   • Alcohol use: Yes     Comment: occasional   • Drug use: No     Family History   Problem Relation Age of Onset   • Hyperlipidemia Biological Mother    • Diabetes Biological Mother    • Osteoporosis Biological Mother    • Heart disease Biological Mother    • Leukemia Biological Father    • Lymphoma Biological Father    • Hypertension Biological Father    • Heart attack Maternal Grandmother    • Heart attack Maternal Grandfather      Allergies/Medications   Allergies: Penicillin g    Current Medications:   Outpatient Encounter Medications as of 10/19/2020:   •  cetirizine (ZyrTEC) 10 mg tablet, Take 10 mg by mouth daily.  •  copper (PARAGARD) 380 square mm intrauterine device intrauterine device, 1 each by intrauterine route once.  •  esomeprazole (NexIUM) 20 mg capsule, Take 20 mg by mouth daily before breakfast.     ROS   The rest of her review of systems is completely negative.    Vitals:    10/19/20 1125   BP: 120/90   BP Location: Right upper arm   Patient Position: Sitting   Pulse: (!) 107   Resp: 16   Temp: 37.1 °C (98.7 °F)   TempSrc: Oral   SpO2: 98%   Weight: 81.6 kg (180 lb)   Height: 1.632 m (5' 4.25\")     Physical Exam   She is comfortable but somewhat anxious.  Skin is warm and dry.  Conjunctiva are pink.  Blood pressure is 120/90.  Heart rate was 107 and regular.  No JVD or HJR.  Chest is clear.  Regular rhythm.  Normal S1 and S2.  No murmurs or gallops noted.  Abdomen is soft and obese " with good bowel sounds.  No organomegaly.  No clubbing, cyanosis, or edema.  Good peripheral pulses.  No rash.  No obvious musculoskeletal deformity.  Labs/ Imaging/Procedures   Labs  Lab Results   Component Value Date    CHOL 174 01/20/2020    CHOL 231 (H) 12/24/2018     Lab Results   Component Value Date    HDL 36 (L) 01/20/2020    HDL 58 12/24/2018     Lab Results   Component Value Date    LDLCALC 119 (H) 01/20/2020    LDLCALC 153 (H) 12/24/2018     Lab Results   Component Value Date    TRIG 90 01/20/2020    TRIG 92 12/24/2018     Lab Results   Component Value Date    WBC 5.2 01/20/2020    HGB 13.9 01/20/2020    HCT 40.3 01/20/2020    MCV 87.2 01/20/2020     01/20/2020     Lab Results   Component Value Date    GLUCOSE 84 01/20/2020    CALCIUM 9.3 01/20/2020     01/20/2020    K 3.9 01/20/2020    CO2 26 01/20/2020     01/20/2020    BUN 12 01/20/2020    CREATININE 0.61 01/20/2020     Echo:   An echo done on 10 1 revealed normal left-ventricular function and no significant valve disease    EKG  Done on 9/29/2020 was within normal limits  Assessment/Plan     Problem List Items Addressed This Visit        Circulatory    Palpitation      Other Visit Diagnoses     Skipped heart beats    -  Primary    Relevant Orders    ECG 12 LEAD OFFICE PERFORMED          Impression:     Yaneli has rare symptomatic PVCs.  Even though they did not show up on her Holter, she showed me how she tracked them on her apple phone, and they are clearly PVCs.  I had a lengthy discussion with her about whether she was having so many that she required treatment.  With a structurally normal heart they are benign especially since they do not happen with activity.  At this point time she would like simple observation.  She understands that if they should happen with exercise, she needs to contact me.  I have encouraged her to start doing more aerobic activity.  I have also suggested that she cut back on her coffee a bit.    Her  blood pressure today as well as her heart rate were up.  She attributes that to her being somewhat anxious.  I would keep an eye on this at her future appointments.    Now, the mainstay of treatment will be simple reassurance.  At this point I will discharge her back to your care.    Thank you for allowing me to participate in the care of this patient.  I hope this information is helpful.  Please do not hesitate to contact me with any questions or concerns.      Viktoriya Lopez MD FACC, FACP  10/19/2020

## 2021-01-12 ENCOUNTER — TELEPHONE (OUTPATIENT)
Dept: FAMILY MEDICINE | Facility: CLINIC | Age: 36
End: 2021-01-12

## 2021-01-12 ENCOUNTER — OFFICE VISIT (OUTPATIENT)
Dept: FAMILY MEDICINE | Facility: CLINIC | Age: 36
End: 2021-01-12
Payer: COMMERCIAL

## 2021-01-12 VITALS
WEIGHT: 184 LBS | BODY MASS INDEX: 31.41 KG/M2 | RESPIRATION RATE: 16 BRPM | OXYGEN SATURATION: 98 % | HEART RATE: 85 BPM | HEIGHT: 64 IN | TEMPERATURE: 98.6 F | SYSTOLIC BLOOD PRESSURE: 118 MMHG | DIASTOLIC BLOOD PRESSURE: 80 MMHG

## 2021-01-12 DIAGNOSIS — Z00.00 PHYSICAL EXAM: Primary | ICD-10-CM

## 2021-01-12 PROCEDURE — 99395 PREV VISIT EST AGE 18-39: CPT | Performed by: FAMILY MEDICINE

## 2021-01-12 ASSESSMENT — ENCOUNTER SYMPTOMS
SINUS PAIN: 0
EYE PAIN: 0
DIARRHEA: 0
RHINORRHEA: 0
NUMBNESS: 0
ABDOMINAL DISTENTION: 0
COUGH: 0
APPETITE CHANGE: 0
FATIGUE: 0
SLEEP DISTURBANCE: 0
EYE DISCHARGE: 0
VOMITING: 0
LIGHT-HEADEDNESS: 0
NERVOUS/ANXIOUS: 0
PHOTOPHOBIA: 0
BLOOD IN STOOL: 0
DYSPHORIC MOOD: 1
TROUBLE SWALLOWING: 0
BACK PAIN: 0
HEMATURIA: 0
PALPITATIONS: 0
NAUSEA: 0
VOICE CHANGE: 0
SORE THROAT: 0
DIFFICULTY URINATING: 0
CONSTIPATION: 0
WHEEZING: 0
DIZZINESS: 0
EYE REDNESS: 0
SHORTNESS OF BREATH: 0
UNEXPECTED WEIGHT CHANGE: 0
DYSURIA: 0
HEADACHES: 0
WEAKNESS: 0
CHEST TIGHTNESS: 0
APNEA: 0
ABDOMINAL PAIN: 0
JOINT SWELLING: 0
FREQUENCY: 0
NECK PAIN: 0
ACTIVITY CHANGE: 0

## 2021-01-12 NOTE — PATIENT INSTRUCTIONS
Patient Education     Health Maintenance, Female  Adopting a healthy lifestyle and getting preventive care are important in promoting health and wellness. Ask your health care provider about:  · The right schedule for you to have regular tests and exams.  · Things you can do on your own to prevent diseases and keep yourself healthy.  What should I know about diet, weight, and exercise?  Eat a healthy diet    · Eat a diet that includes plenty of vegetables, fruits, low-fat dairy products, and lean protein.  · Do not eat a lot of foods that are high in solid fats, added sugars, or sodium.  Maintain a healthy weight  Body mass index (BMI) is used to identify weight problems. It estimates body fat based on height and weight. Your health care provider can help determine your BMI and help you achieve or maintain a healthy weight.  Get regular exercise  Get regular exercise. This is one of the most important things you can do for your health. Most adults should:  · Exercise for at least 150 minutes each week. The exercise should increase your heart rate and make you sweat (moderate-intensity exercise).  · Do strengthening exercises at least twice a week. This is in addition to the moderate-intensity exercise.  · Spend less time sitting. Even light physical activity can be beneficial.  Watch cholesterol and blood lipids  Have your blood tested for lipids and cholesterol at 20 years of age, then have this test every 5 years.  Have your cholesterol levels checked more often if:  · Your lipid or cholesterol levels are high.  · You are older than 40 years of age.  · You are at high risk for heart disease.  What should I know about cancer screening?  Depending on your health history and family history, you may need to have cancer screening at various ages. This may include screening for:  · Breast cancer.  · Cervical cancer.  · Colorectal cancer.  · Skin cancer.  · Lung cancer.  What should I know about heart disease,  diabetes, and high blood pressure?  Blood pressure and heart disease  · High blood pressure causes heart disease and increases the risk of stroke. This is more likely to develop in people who have high blood pressure readings, are of  descent, or are overweight.  · Have your blood pressure checked:  ? Every 3-5 years if you are 18-39 years of age.  ? Every year if you are 40 years old or older.  Diabetes  Have regular diabetes screenings. This checks your fasting blood sugar level. Have the screening done:  · Once every three years after age 40 if you are at a normal weight and have a low risk for diabetes.  · More often and at a younger age if you are overweight or have a high risk for diabetes.  What should I know about preventing infection?  Hepatitis B  If you have a higher risk for hepatitis B, you should be screened for this virus. Talk with your health care provider to find out if you are at risk for hepatitis B infection.  Hepatitis C  Testing is recommended for:  · Everyone born from 1945 through 1965.  · Anyone with known risk factors for hepatitis C.  Sexually transmitted infections (STIs)  · Get screened for STIs, including gonorrhea and chlamydia, if:  ? You are sexually active and are younger than 24 years of age.  ? You are older than 24 years of age and your health care provider tells you that you are at risk for this type of infection.  ? Your sexual activity has changed since you were last screened, and you are at increased risk for chlamydia or gonorrhea. Ask your health care provider if you are at risk.  · Ask your health care provider about whether you are at high risk for HIV. Your health care provider may recommend a prescription medicine to help prevent HIV infection. If you choose to take medicine to prevent HIV, you should first get tested for HIV. You should then be tested every 3 months for as long as you are taking the medicine.  Pregnancy  · If you are about to stop having your  period (premenopausal) and you may become pregnant, seek counseling before you get pregnant.  · Take 400 to 800 micrograms (mcg) of folic acid every day if you become pregnant.  · Ask for birth control (contraception) if you want to prevent pregnancy.  Osteoporosis and menopause  Osteoporosis is a disease in which the bones lose minerals and strength with aging. This can result in bone fractures. If you are 65 years old or older, or if you are at risk for osteoporosis and fractures, ask your health care provider if you should:  · Be screened for bone loss.  · Take a calcium or vitamin D supplement to lower your risk of fractures.  · Be given hormone replacement therapy (HRT) to treat symptoms of menopause.  Follow these instructions at home:  Lifestyle  · Do not use any products that contain nicotine or tobacco, such as cigarettes, e-cigarettes, and chewing tobacco. If you need help quitting, ask your health care provider.  · Do not use street drugs.  · Do not share needles.  · Ask your health care provider for help if you need support or information about quitting drugs.  Alcohol use  · Do not drink alcohol if:  ? Your health care provider tells you not to drink.  ? You are pregnant, may be pregnant, or are planning to become pregnant.  · If you drink alcohol:  ? Limit how much you use to 0-1 drink a day.  ? Limit intake if you are breastfeeding.  · Be aware of how much alcohol is in your drink. In the [place] one drink equals one 12 oz bottle of beer (355 mL), one 5 oz glass of wine (148 mL), or one 1½ oz glass of hard liquor (44 mL).  General instructions  · Schedule regular health, dental, and eye exams.  · Stay current with your vaccines.  · Tell your health care provider if:  ? You often feel depressed.  ? You have ever been abused or do not feel safe at home.  Summary  · Adopting a healthy lifestyle and getting preventive care are important in promoting health and wellness.  · Follow your health care provider's  instructions about healthy diet, exercising, and getting tested or screened for diseases.  · Follow your health care provider's instructions on monitoring your cholesterol and blood pressure.  This information is not intended to replace advice given to you by your health care provider. Make sure you discuss any questions you have with your health care provider.  Document Released: 07/02/2012 Document Revised: 12/11/2019 Document Reviewed: 12/11/2019  Elsevier Patient Education © 2020 [employer]

## 2021-01-12 NOTE — PROGRESS NOTES
Subjective      Patient ID: Fatoumata De Los Santos is a 35 y.o. female.  1985      HPI  Pt presents for physical exam  Had covid(never tested but had same symptoms as  who was positive)  She loss sense of smell and taste  Stuck in house for about a month  Anxiety was high during that time  Better now that she is in routine  Saw Dr John Haddad  Nothing to worry about  [facility]  The following have been reviewed and updated as appropriate in this visit:  Tobacco  Allergies  Meds  Problems  Med Hx  Surg Hx  Fam Hx  Soc Hx        Review of Systems   Constitutional: Negative for activity change, appetite change, fatigue and unexpected weight change.   HENT: Negative for congestion, ear pain, hearing loss, rhinorrhea, sinus pain, sneezing, sore throat, trouble swallowing and voice change.    Eyes: Negative for photophobia, pain, discharge, redness and visual disturbance.   Respiratory: Negative for apnea, cough, chest tightness, shortness of breath and wheezing.    Cardiovascular: Negative for chest pain, palpitations and leg swelling.   Gastrointestinal: Negative for abdominal distention, abdominal pain, blood in stool, constipation, diarrhea, nausea and vomiting.   Endocrine: Negative for cold intolerance and heat intolerance.   Genitourinary: Negative for decreased urine volume, difficulty urinating, dysuria, frequency, hematuria, urgency, vaginal bleeding, vaginal discharge and vaginal pain.   Musculoskeletal: Negative for back pain, joint swelling and neck pain.   Skin: Negative for rash.   Neurological: Negative for dizziness, weakness, light-headedness, numbness and headaches.   Psychiatric/Behavioral: Positive for dysphoric mood. Negative for sleep disturbance. The patient is not nervous/anxious.        Objective     Vitals:    01/12/21 1732   BP: 118/80   BP Location: Left upper arm   Patient Position: Sitting   Pulse: 85   Resp: 16   Temp: 37 °C (98.6 °F)   TempSrc: Oral  "  SpO2: 98%   Weight: 83.5 kg (184 lb)   Height: 1.632 m (5' 4.25\")     Body mass index is 31.34 kg/m².    Physical Exam  Vitals signs and nursing note reviewed.   Constitutional:       Appearance: She is well-developed.   HENT:      Head: Normocephalic.      Right Ear: Tympanic membrane, ear canal and external ear normal.      Left Ear: Tympanic membrane, ear canal and external ear normal.      Nose: Nose normal.   Eyes:      Conjunctiva/sclera: Conjunctivae normal.      Pupils: Pupils are equal, round, and reactive to light.   Neck:      Vascular: No JVD.   Cardiovascular:      Rate and Rhythm: Normal rate and regular rhythm.      Heart sounds: No murmur.   Pulmonary:      Effort: Pulmonary effort is normal. No respiratory distress.      Breath sounds: Normal breath sounds. No wheezing.   Abdominal:      General: Bowel sounds are normal. There is no distension.      Palpations: Abdomen is soft. There is no mass.      Tenderness: There is no abdominal tenderness. There is no guarding or rebound.      Hernia: No hernia is present.   Musculoskeletal:         General: No tenderness.   Lymphadenopathy:      Cervical: No cervical adenopathy.   Skin:     General: Skin is warm and dry.   Neurological:      Mental Status: She is alert and oriented to person, place, and time.      Sensory: No sensory deficit.      Deep Tendon Reflexes: Reflexes normal.   Psychiatric:         Mood and Affect: Mood normal.         Behavior: Behavior normal.         Assessment/Plan   Diagnoses and all orders for this visit:    Physical exam (Primary)  Comments:  diet/exercise  check labs  Orders:  -     Lipid panel; Future  -     TSH w reflex FT4; Future  -     Comprehensive metabolic panel; Future  -     CBC and Differential; Future    Return in about 1 year (around 1/12/2022) for PE.  Patient  agreeable with plan and verbalized understanding      "

## 2021-04-15 DIAGNOSIS — Z23 ENCOUNTER FOR IMMUNIZATION: ICD-10-CM

## 2021-04-17 DIAGNOSIS — E78.2 MIXED HYPERLIPIDEMIA: Primary | ICD-10-CM

## 2021-04-17 LAB
ALBUMIN SERPL-MCNC: 4.3 G/DL (ref 3.6–5.1)
ALBUMIN/GLOB SERPL: 1.5 (CALC) (ref 1–2.5)
ALP SERPL-CCNC: 38 U/L (ref 31–125)
ALT SERPL-CCNC: 11 U/L (ref 6–29)
AST SERPL-CCNC: 11 U/L (ref 10–30)
BASOPHILS # BLD AUTO: 29 CELLS/UL (ref 0–200)
BASOPHILS NFR BLD AUTO: 0.7 %
BILIRUB SERPL-MCNC: 0.6 MG/DL (ref 0.2–1.2)
BUN SERPL-MCNC: 14 MG/DL (ref 7–25)
BUN/CREAT SERPL: NORMAL (CALC) (ref 6–22)
CALCIUM SERPL-MCNC: 9.2 MG/DL (ref 8.6–10.2)
CHLORIDE SERPL-SCNC: 108 MMOL/L (ref 98–110)
CHOLEST SERPL-MCNC: 203 MG/DL
CHOLEST/HDLC SERPL: 5.5 (CALC)
CO2 SERPL-SCNC: 25 MMOL/L (ref 20–32)
CREAT SERPL-MCNC: 0.64 MG/DL (ref 0.5–1.1)
EOSINOPHIL # BLD AUTO: 70 CELLS/UL (ref 15–500)
EOSINOPHIL NFR BLD AUTO: 1.7 %
ERYTHROCYTE [DISTWIDTH] IN BLOOD BY AUTOMATED COUNT: 13.1 % (ref 11–15)
GLOBULIN SER CALC-MCNC: 2.9 G/DL (CALC) (ref 1.9–3.7)
GLUCOSE SERPL-MCNC: 89 MG/DL (ref 65–99)
HCT VFR BLD AUTO: 41.6 % (ref 35–45)
HDLC SERPL-MCNC: 37 MG/DL
HGB BLD-MCNC: 14.4 G/DL (ref 11.7–15.5)
LDLC SERPL CALC-MCNC: 143 MG/DL (CALC)
LYMPHOCYTES # BLD AUTO: 1439 CELLS/UL (ref 850–3900)
LYMPHOCYTES NFR BLD AUTO: 35.1 %
MCH RBC QN AUTO: 30.3 PG (ref 27–33)
MCHC RBC AUTO-ENTMCNC: 34.6 G/DL (ref 32–36)
MCV RBC AUTO: 87.4 FL (ref 80–100)
MONOCYTES # BLD AUTO: 344 CELLS/UL (ref 200–950)
MONOCYTES NFR BLD AUTO: 8.4 %
NEUTROPHILS # BLD AUTO: 2218 CELLS/UL (ref 1500–7800)
NEUTROPHILS NFR BLD AUTO: 54.1 %
NONHDLC SERPL-MCNC: 166 MG/DL (CALC)
PLATELET # BLD AUTO: 304 THOUSAND/UL (ref 140–400)
PMV BLD REES-ECKER: 9.3 FL (ref 7.5–12.5)
POTASSIUM SERPL-SCNC: 3.9 MMOL/L (ref 3.5–5.3)
PROT SERPL-MCNC: 7.2 G/DL (ref 6.1–8.1)
QUEST EGFR AFRICAN AMERICAN: 134 ML/MIN/1.73M2
QUEST EGFR NON-AFR. AMERICAN: 116 ML/MIN/1.73M2
RBC # BLD AUTO: 4.76 MILLION/UL (ref 3.8–5.1)
SODIUM SERPL-SCNC: 141 MMOL/L (ref 135–146)
TRIGL SERPL-MCNC: 113 MG/DL
TSH SERPL-ACNC: 0.98 MIU/L
WBC # BLD AUTO: 4.1 THOUSAND/UL (ref 3.8–10.8)

## 2021-04-17 NOTE — RESULT ENCOUNTER NOTE
Fatoumata   Your cholesterol is significantly increased since last year work on diet and exercise and I will mail you an order to recheck in 6 months  Dr Camap

## 2021-06-23 ENCOUNTER — OFFICE VISIT (OUTPATIENT)
Dept: CARDIOLOGY | Facility: CLINIC | Age: 36
End: 2021-06-23
Payer: COMMERCIAL

## 2021-06-23 VITALS
DIASTOLIC BLOOD PRESSURE: 82 MMHG | HEART RATE: 117 BPM | OXYGEN SATURATION: 99 % | RESPIRATION RATE: 16 BRPM | SYSTOLIC BLOOD PRESSURE: 126 MMHG

## 2021-06-23 DIAGNOSIS — I49.3 PVC'S (PREMATURE VENTRICULAR CONTRACTIONS): ICD-10-CM

## 2021-06-23 DIAGNOSIS — I45.9 SKIPPED HEART BEATS: ICD-10-CM

## 2021-06-23 DIAGNOSIS — R00.2 PALPITATION: Primary | ICD-10-CM

## 2021-06-23 DIAGNOSIS — R07.89 OTHER CHEST PAIN: ICD-10-CM

## 2021-06-23 PROCEDURE — 99214 OFFICE O/P EST MOD 30 MIN: CPT | Performed by: INTERNAL MEDICINE

## 2021-06-23 PROCEDURE — 93000 ELECTROCARDIOGRAM COMPLETE: CPT | Performed by: INTERNAL MEDICINE

## 2021-06-23 RX ORDER — PANTOPRAZOLE SODIUM 40 MG/1
TABLET, DELAYED RELEASE ORAL
COMMUNITY
Start: 2021-06-12 | End: 2024-01-02 | Stop reason: SDUPTHER

## 2021-06-23 RX ORDER — METOCLOPRAMIDE 5 MG/1
TABLET ORAL
COMMUNITY
Start: 2021-06-01 | End: 2021-08-31

## 2021-06-23 RX ORDER — METOPROLOL SUCCINATE 25 MG/1
25 TABLET, EXTENDED RELEASE ORAL DAILY
Qty: 30 TABLET | Refills: 3 | Status: SHIPPED | OUTPATIENT
Start: 2021-06-23 | End: 2021-09-08 | Stop reason: SDUPTHER

## 2021-06-23 NOTE — LETTER
June 23, 2021     Mari Campa,   [address] [place] [address]    Patient: Fatoumata De Los Santos  YOB: 1985  Date of Visit: 6/23/2021      Dear Dr. Gretchen Campa:    Thank you for referring Fatoumata De Los Santos to me for evaluation. Below are my notes for this consultation.    If you have questions, please do not hesitate to call me. I look forward to following your patient along with you.         Sincerely,        Viktoriya Lopez MD        CC: No Recipients  Viktoriya Lopez MD  6/23/2021  3:44 PM  Signed       Viktoriya Lopez MD       Reason for visit : Follow up  HPI   Fatoumata De Los Santos is a 35 y.o. female who presents to the office for cardiovascular follow up.      Dear Mari,    On June 23, 2021 I saw Yaneli De Los Santos in the office once again.  I originally saw her several months ago for symptomatic PVCs.  She had a structurally normal heart.  After lengthy discussion she wanted to continue to observe them.    Since I have seen her the palpitations and PVCs have become much worse.  They do happen during the daytime hours but are especially bothersome in the afternoon and evening.  She feels that they are worse after she eats.  She has not noted that they are necessarily worse around the time of her menstrual periods.  She has also had multiple other GI issues and is actively seeing a gastroenterologist.  She had a normal upper endoscopy and is scheduled to have a barium swallow.    She also describes significant burning in her chest as well as left-sided chest pain that seems to a improved with Pepcid.  She does not think that Nexium helps as much as the H2 blockers do.  The pain can sometimes last for hours, and is never associated with exertion.  She is fearful that this could be related to her heart.    She has had no lightheadedness, palpitations, shortness of breath, or syncope.    I have reviewed all of her medications, allergies, social history, medical  history, family history and surgical history.  None of this is changed since have seen her last.  The rest of her review of systems is completely negative       Problem List:  2020-10: Gastroesophageal reflux disease without esophagitis  2020-10: Palpitation           Current Outpatient Medications   Medication Sig   • metoclopramide (REGLAN) 5 mg tablet    • pantoprazole (PROTONIX) 40 mg EC tablet    • cetirizine (ZyrTEC) 10 mg tablet Take 10 mg by mouth as needed.     • copper (PARAGARD) 380 square mm intrauterine device intrauterine device 1 each by intrauterine route once.   • metoprolol succinate XL (TOPROL XL) 25 mg 24 hr tablet Take 1 tablet (25 mg total) by mouth daily.     No current facility-administered medications for this visit.          Allergies:  Penicillin g    Surgical History:  Past Surgical History:   Procedure Laterality Date   • APPENDECTOMY     •  SECTION      x2        Family History:  Family History   Problem Relation Age of Onset   • Hyperlipidemia Biological Mother    • Diabetes Biological Mother    • Osteoporosis Biological Mother    • Heart disease Biological Mother    • Leukemia Biological Father    • Lymphoma Biological Father    • Hypertension Biological Father    • Heart attack Maternal Grandmother    • Heart attack Maternal Grandfather         Social History:  Social History     Socioeconomic History   • Marital status:      Spouse name: None   • Number of children: None   • Years of education: None   • Highest education level: None   Occupational History   • None   Tobacco Use   • Smoking status: Never Smoker   • Smokeless tobacco: Never Used   Vaping Use   • Vaping Use: Never used   Substance and Sexual Activity   • Alcohol use: Yes     Comment: occasional   • Drug use: No   • Sexual activity: Yes     Partners: Male     Birth control/protection: I.U.D.   Other Topics Concern   • None   Social History Narrative   • None     Social Determinants of Health     Financial  Resource Strain:    • Difficulty of Paying Living Expenses:    Food Insecurity:    • Worried About Running Out of Food in the Last Year:    • Ran Out of Food in the Last Year:    Transportation Needs:    • Lack of Transportation (Medical):    • Lack of Transportation (Non-Medical):    Physical Activity:    • Days of Exercise per Week:    • Minutes of Exercise per Session:    Stress:    • Feeling of Stress :    Social Connections:    • Frequency of Communication with Friends and Family:    • Frequency of Social Gatherings with Friends and Family:    • Attends Evangelical Services:    • Active Member of Clubs or Organizations:    • Attends Club or Organization Meetings:    • Marital Status:    Intimate Partner Violence:    • Fear of Current or Ex-Partner:    • Emotionally Abused:    • Physically Abused:    • Sexually Abused:            Review of Systems    The rest of her review of systems is completely negative.       Objective   Vitals:    06/23/21 1512 06/23/21 1514   BP: 126/82 126/82   BP Location: Left upper arm Right upper arm   Patient Position: Sitting Sitting   Pulse: (!) 117    Resp: 16    SpO2: 99%      Physical Exam  Blood pressure is 126/82.  Heart rate is 117 and regular.  Skin is warm and dry.  Conjunctiva are pink.  Affect is anxious.  No JVD or HJR.  Carotids are unremarkable.  Chest is clear.  Regular rhythm.  Normal S1 and S2.  There is an early systolic murmur heard at the apex.  There are no gallops.  Abdomen is soft with good bowel sounds.  No organomegaly.  No clubbing, cyanosis, or edema.  Good peripheral pulses.  No obvious musculoskeletal deformity.       Labs:   Lab Results   Component Value Date    WBC 4.1 04/16/2021    HGB 14.4 04/16/2021    HCT 41.6 04/16/2021     04/16/2021    CHOL 203 (H) 04/16/2021    TRIG 113 04/16/2021    HDL 37 (L) 04/16/2021    LDLCALC 143 (H) 04/16/2021    ALT 11 04/16/2021    AST 11 04/16/2021     04/16/2021    K 3.9 04/16/2021     04/16/2021     CREATININE 0.64 04/16/2021    BUN 14 04/16/2021    CO2 25 04/16/2021    TSH 0.98 04/16/2021       ECG :  Today's EKG is within normal limits.    Cardiac Imaging    TRANSTHORACIC ECHO (TTE) COMPLETE 10/01/2020    Interpretation Summary  · Normal LV systolic function. Estimated EF 60- 65%.  · Normal-sized LV. Normal LV wall thickness.             Problem List Items Addressed This Visit        Circulatory    Palpitation - Primary    Relevant Medications    metoprolol succinate XL (TOPROL XL) 25 mg 24 hr tablet    Other Relevant Orders    ECG 12 lead (Completed)      Other Visit Diagnoses     Skipped heart beats        Relevant Orders    ECG 12 lead (Completed)    PVC's (premature ventricular contractions)        Relevant Medications    metoprolol succinate XL (TOPROL XL) 25 mg 24 hr tablet    Other Relevant Orders    ECG 12 lead (Completed)    Other chest pain        Relevant Orders    Exercise stress test          Impression:    Yaneli clearly does have symptomatic PVCs.  She is at a point the they are driving her completely out of her mind and she would like treatment for these.  I have started her on Toprol 25 mg daily.  Hopefully this will do the trick.  She will call me in 2 weeks to let me know how she is feeling on the Toprol.    I do not believe that the chest pain has anything to do with coronary disease or any type of heart related issue.  I do, however,  believe that until we can prove to her that her heart is not the issue she will not rest comfortably.  She will be having a regular stress test done in the near future.  I will be in touch with you once I see the results.  Her follow-up will depend on how things go.    I thank you for allowing me to participate in the care of your patient.  If I can furnish you with any further details, please do not hesitate to contact me.     Viktoriya Lopez MD  6/23/2021    This document was generated utilizing voice recognition technology. A reasonable attempt at  proofreading has been made to minimize errors but please excuse any typographical errors which may be present. Please call with any questions.

## 2021-06-23 NOTE — PROGRESS NOTES
Viktoriya Lopez MD       Reason for visit : Follow up  HPI   Fatoumata De Los Santos is a 35 y.o. female who presents to the office for cardiovascular follow up.      Dear Mari,    On June 23, 2021 I saw Yaneli De Los Santos in the office once again.  I originally saw her several months ago for symptomatic PVCs.  She had a structurally normal heart.  After lengthy discussion she wanted to continue to observe them.    Since I have seen her the palpitations and PVCs have become much worse.  They do happen during the daytime hours but are especially bothersome in the afternoon and evening.  She feels that they are worse after she eats.  She has not noted that they are necessarily worse around the time of her menstrual periods.  She has also had multiple other GI issues and is actively seeing a gastroenterologist.  She had a normal upper endoscopy and is scheduled to have a barium swallow.    She also describes significant burning in her chest as well as left-sided chest pain that seems to a improved with Pepcid.  She does not think that Nexium helps as much as the H2 blockers do.  The pain can sometimes last for hours, and is never associated with exertion.  She is fearful that this could be related to her heart.    She has had no lightheadedness, palpitations, shortness of breath, or syncope.    I have reviewed all of her medications, allergies, social history, medical history, family history and surgical history.  None of this is changed since have seen her last.  The rest of her review of systems is completely negative       Problem List:  2020-10: Gastroesophageal reflux disease without esophagitis  2020-10: Palpitation           Current Outpatient Medications   Medication Sig   • metoclopramide (REGLAN) 5 mg tablet    • pantoprazole (PROTONIX) 40 mg EC tablet    • cetirizine (ZyrTEC) 10 mg tablet Take 10 mg by mouth as needed.     • copper (PARAGARD) 380 square mm intrauterine device intrauterine device 1 each  by intrauterine route once.   • metoprolol succinate XL (TOPROL XL) 25 mg 24 hr tablet Take 1 tablet (25 mg total) by mouth daily.     No current facility-administered medications for this visit.          Allergies:  Penicillin g    Surgical History:  Past Surgical History:   Procedure Laterality Date   • APPENDECTOMY     •  SECTION      x2        Family History:  Family History   Problem Relation Age of Onset   • Hyperlipidemia Biological Mother    • Diabetes Biological Mother    • Osteoporosis Biological Mother    • Heart disease Biological Mother    • Leukemia Biological Father    • Lymphoma Biological Father    • Hypertension Biological Father    • Heart attack Maternal Grandmother    • Heart attack Maternal Grandfather         Social History:  Social History     Socioeconomic History   • Marital status:      Spouse name: None   • Number of children: None   • Years of education: None   • Highest education level: None   Occupational History   • None   Tobacco Use   • Smoking status: Never Smoker   • Smokeless tobacco: Never Used   Vaping Use   • Vaping Use: Never used   Substance and Sexual Activity   • Alcohol use: Yes     Comment: occasional   • Drug use: No   • Sexual activity: Yes     Partners: Male     Birth control/protection: I.U.D.   Other Topics Concern   • None   Social History Narrative   • None     Social Determinants of Health     Financial Resource Strain:    • Difficulty of Paying Living Expenses:    Food Insecurity:    • Worried About Running Out of Food in the Last Year:    • Ran Out of Food in the Last Year:    Transportation Needs:    • Lack of Transportation (Medical):    • Lack of Transportation (Non-Medical):    Physical Activity:    • Days of Exercise per Week:    • Minutes of Exercise per Session:    Stress:    • Feeling of Stress :    Social Connections:    • Frequency of Communication with Friends and Family:    • Frequency of Social Gatherings with Friends and Family:     • Attends Jewish Services:    • Active Member of Clubs or Organizations:    • Attends Club or Organization Meetings:    • Marital Status:    Intimate Partner Violence:    • Fear of Current or Ex-Partner:    • Emotionally Abused:    • Physically Abused:    • Sexually Abused:            Review of Systems    The rest of her review of systems is completely negative.       Objective   Vitals:    06/23/21 1512 06/23/21 1514   BP: 126/82 126/82   BP Location: Left upper arm Right upper arm   Patient Position: Sitting Sitting   Pulse: (!) 117    Resp: 16    SpO2: 99%      Physical Exam  Blood pressure is 126/82.  Heart rate is 117 and regular.  Skin is warm and dry.  Conjunctiva are pink.  Affect is anxious.  No JVD or HJR.  Carotids are unremarkable.  Chest is clear.  Regular rhythm.  Normal S1 and S2.  There is an early systolic murmur heard at the apex.  There are no gallops.  Abdomen is soft with good bowel sounds.  No organomegaly.  No clubbing, cyanosis, or edema.  Good peripheral pulses.  No obvious musculoskeletal deformity.       Labs:   Lab Results   Component Value Date    WBC 4.1 04/16/2021    HGB 14.4 04/16/2021    HCT 41.6 04/16/2021     04/16/2021    CHOL 203 (H) 04/16/2021    TRIG 113 04/16/2021    HDL 37 (L) 04/16/2021    LDLCALC 143 (H) 04/16/2021    ALT 11 04/16/2021    AST 11 04/16/2021     04/16/2021    K 3.9 04/16/2021     04/16/2021    CREATININE 0.64 04/16/2021    BUN 14 04/16/2021    CO2 25 04/16/2021    TSH 0.98 04/16/2021       ECG :  Today's EKG is within normal limits.    Cardiac Imaging    TRANSTHORACIC ECHO (TTE) COMPLETE 10/01/2020    Interpretation Summary  · Normal LV systolic function. Estimated EF 60- 65%.  · Normal-sized LV. Normal LV wall thickness.             Problem List Items Addressed This Visit        Circulatory    Palpitation - Primary    Relevant Medications    metoprolol succinate XL (TOPROL XL) 25 mg 24 hr tablet    Other Relevant Orders    ECG 12 lead  (Completed)      Other Visit Diagnoses     Skipped heart beats        Relevant Orders    ECG 12 lead (Completed)    PVC's (premature ventricular contractions)        Relevant Medications    metoprolol succinate XL (TOPROL XL) 25 mg 24 hr tablet    Other Relevant Orders    ECG 12 lead (Completed)    Other chest pain        Relevant Orders    Exercise stress test          Impression:    Yaneli clearly does have symptomatic PVCs.  She is at a point the they are driving her completely out of her mind and she would like treatment for these.  I have started her on Toprol 25 mg daily.  Hopefully this will do the trick.  She will call me in 2 weeks to let me know how she is feeling on the Toprol.    I do not believe that the chest pain has anything to do with coronary disease or any type of heart related issue.  I do, however,  believe that until we can prove to her that her heart is not the issue she will not rest comfortably.  She will be having a regular stress test done in the near future.  I will be in touch with you once I see the results.  Her follow-up will depend on how things go.    I thank you for allowing me to participate in the care of your patient.  If I can furnish you with any further details, please do not hesitate to contact me.     Viktoriya Lopez MD  6/23/2021    This document was generated utilizing voice recognition technology. A reasonable attempt at proofreading has been made to minimize errors but please excuse any typographical errors which may be present. Please call with any questions.

## 2021-07-06 ENCOUNTER — TELEPHONE (OUTPATIENT)
Dept: CARDIOLOGY | Facility: CLINIC | Age: 36
End: 2021-07-06

## 2021-07-06 ENCOUNTER — HOSPITAL ENCOUNTER (OUTPATIENT)
Dept: CARDIOLOGY | Facility: CLINIC | Age: 36
Discharge: HOME | End: 2021-07-06
Payer: COMMERCIAL

## 2021-07-06 VITALS
HEIGHT: 64 IN | DIASTOLIC BLOOD PRESSURE: 80 MMHG | WEIGHT: 184 LBS | SYSTOLIC BLOOD PRESSURE: 120 MMHG | BODY MASS INDEX: 31.41 KG/M2

## 2021-07-06 DIAGNOSIS — R07.89 OTHER CHEST PAIN: ICD-10-CM

## 2021-07-06 LAB
STRESS ANGINA INDEX: 0
STRESS BASELINE BP: NORMAL MMHG
STRESS BASELINE HR: 62 BPM
STRESS PERCENT HR: 86 %
STRESS POST ESTIMATED WORKLOAD: 8 METS
STRESS POST EXERCISE DUR MIN: 6 MIN
STRESS POST EXERCISE DUR SEC: 22 SEC
STRESS POST PEAK BP: NORMAL MMHG
STRESS POST PEAK HR: 160 BPM
STRESS TARGET HR: 157 BPM

## 2021-07-06 PROCEDURE — G1004 CDSM NDSC: HCPCS | Performed by: INTERNAL MEDICINE

## 2021-07-06 PROCEDURE — 93015 CV STRESS TEST SUPVJ I&R: CPT | Performed by: INTERNAL MEDICINE

## 2021-07-06 NOTE — TELEPHONE ENCOUNTER
On July 6, 2021 I spoke with Yaneli about her stress test.  It was completely normal.  There was no ischemia and she had no arrhythmias.    She tells me that since she is on the Toprol she is much improved with only a rare skipped beat.  She is very happy with the way she currently feels.  She will stay on her current dose of Toprol and I will see her again in 6 months for reevaluation unless the palpitations worsen.

## 2021-08-31 ENCOUNTER — OFFICE VISIT (OUTPATIENT)
Dept: FAMILY MEDICINE | Facility: CLINIC | Age: 36
End: 2021-08-31
Payer: COMMERCIAL

## 2021-08-31 VITALS
OXYGEN SATURATION: 99 % | SYSTOLIC BLOOD PRESSURE: 110 MMHG | BODY MASS INDEX: 29.5 KG/M2 | WEIGHT: 172.8 LBS | TEMPERATURE: 98.2 F | DIASTOLIC BLOOD PRESSURE: 80 MMHG | HEIGHT: 64 IN | HEART RATE: 85 BPM | RESPIRATION RATE: 16 BRPM

## 2021-08-31 DIAGNOSIS — G89.29 CHRONIC BILATERAL THORACIC BACK PAIN: Primary | ICD-10-CM

## 2021-08-31 DIAGNOSIS — M54.6 CHRONIC BILATERAL THORACIC BACK PAIN: Primary | ICD-10-CM

## 2021-08-31 DIAGNOSIS — R07.89 OTHER CHEST PAIN: ICD-10-CM

## 2021-08-31 PROCEDURE — 99213 OFFICE O/P EST LOW 20 MIN: CPT | Performed by: FAMILY MEDICINE

## 2021-08-31 PROCEDURE — 3008F BODY MASS INDEX DOCD: CPT | Performed by: FAMILY MEDICINE

## 2021-09-08 ENCOUNTER — HOSPITAL ENCOUNTER (OUTPATIENT)
Dept: RADIOLOGY | Age: 36
Discharge: HOME | End: 2021-09-08
Attending: FAMILY MEDICINE
Payer: COMMERCIAL

## 2021-09-08 DIAGNOSIS — G89.29 CHRONIC BILATERAL THORACIC BACK PAIN: ICD-10-CM

## 2021-09-08 DIAGNOSIS — R07.89 OTHER CHEST PAIN: ICD-10-CM

## 2021-09-08 DIAGNOSIS — M54.6 CHRONIC BILATERAL THORACIC BACK PAIN: ICD-10-CM

## 2021-09-08 DIAGNOSIS — R00.2 PALPITATION: ICD-10-CM

## 2021-09-08 PROCEDURE — 72072 X-RAY EXAM THORAC SPINE 3VWS: CPT

## 2021-09-08 PROCEDURE — 71111 X-RAY EXAM RIBS/CHEST4/> VWS: CPT

## 2021-09-09 RX ORDER — METOPROLOL SUCCINATE 25 MG/1
25 TABLET, EXTENDED RELEASE ORAL DAILY
Qty: 30 TABLET | Refills: 3 | Status: SHIPPED | OUTPATIENT
Start: 2021-09-09 | End: 2021-09-12

## 2021-09-11 DIAGNOSIS — R00.2 PALPITATION: ICD-10-CM

## 2021-09-12 RX ORDER — METOPROLOL SUCCINATE 25 MG/1
TABLET, EXTENDED RELEASE ORAL
Qty: 90 TABLET | Refills: 1 | Status: SHIPPED | OUTPATIENT
Start: 2021-09-12 | End: 2022-01-06 | Stop reason: SDUPTHER

## 2022-01-06 ENCOUNTER — OFFICE VISIT (OUTPATIENT)
Dept: CARDIOLOGY | Facility: CLINIC | Age: 37
End: 2022-01-06
Payer: COMMERCIAL

## 2022-01-06 VITALS
BODY MASS INDEX: 29.81 KG/M2 | RESPIRATION RATE: 18 BRPM | HEART RATE: 72 BPM | SYSTOLIC BLOOD PRESSURE: 100 MMHG | WEIGHT: 175 LBS | OXYGEN SATURATION: 99 % | DIASTOLIC BLOOD PRESSURE: 84 MMHG

## 2022-01-06 DIAGNOSIS — R00.2 PALPITATION: ICD-10-CM

## 2022-01-06 DIAGNOSIS — I49.3 PVC'S (PREMATURE VENTRICULAR CONTRACTIONS): Primary | ICD-10-CM

## 2022-01-06 PROCEDURE — 99212 OFFICE O/P EST SF 10 MIN: CPT | Performed by: INTERNAL MEDICINE

## 2022-01-06 PROCEDURE — 3008F BODY MASS INDEX DOCD: CPT | Performed by: INTERNAL MEDICINE

## 2022-01-06 RX ORDER — METOPROLOL SUCCINATE 25 MG/1
25 TABLET, EXTENDED RELEASE ORAL
Qty: 90 TABLET | Refills: 3 | Status: SHIPPED | OUTPATIENT
Start: 2022-01-06 | End: 2023-03-09

## 2022-01-06 NOTE — LETTER
January 6, 2022     Mari Campa DO  [address] [place], [place] [id number]    Patient: Fatoumata De Los Santos  YOB: 1985  Date of Visit: 1/6/2022      Dear Dr. Gretchen Campa:    Thank you for referring Fatoumata De Los Santos to me for evaluation. Below are my notes for this consultation.    If you have questions, please do not hesitate to call me. I look forward to following your patient along with you.         Sincerely,        Viktoriya Lopez MD        CC: No Recipients  Viktoriya Lopez MD  1/6/2022  8:21 AM  Signed       Viktoriya Lopez MD       Reason for visit : Follow up  HPI   Fatoumata De Los Santos is a 36 y.o. female who presents to the office for cardiovascular follow up.      Dear Mari,    On January 6, 2022 I saw Yaneli De Los Santos in the office for follow-up.  As you know I originally saw her several months ago with symptomatic PVCs.  She has a structurally normal heart.  Originally she wanted to simply observe and not use any medications.  After several weeks, she felt that the PVCs were really interfering with her life and Toprol 25 mg was started.  Since that time she tells me she is feeling much better with only a rare palpitation.  She denies chest pain, shortness of breath, or lightheadedness.  She has had no side effects from the Toprol.  She did have a stress test that was negative for ischemia.    She also is treated for reflux.  I have reviewed her allergies, social history, medical history, and surgical history.  None of this is changed since I have seen her last other than the above-mentioned.  The rest of her review of systems is completely negative.       Problem List:  2022-01: PVCs (premature ventricular contractions)  2020-10: Gastroesophageal reflux disease without esophagitis  2020-10: Palpitation           Current Outpatient Medications   Medication Sig   • cetirizine (ZyrTEC) 10 mg tablet Take 10 mg by mouth as needed.     • copper (PARAGARD) 380  square mm intrauterine device intrauterine device 1 each by intrauterine route once.   • metoprolol succinate XL 25 mg 24 hr tablet Take 1 tablet (25 mg total) by mouth once daily.   • pantoprazole (PROTONIX) 40 mg EC tablet      No current facility-administered medications for this visit.          Allergies:  Penicillin g    Surgical History:  Past Surgical History:   Procedure Laterality Date   • APPENDECTOMY     •  SECTION      x2        Family History:  Family History   Problem Relation Age of Onset   • Hyperlipidemia Biological Mother    • Diabetes Biological Mother    • Osteoporosis Biological Mother    • Heart disease Biological Mother    • Leukemia Biological Father    • Lymphoma Biological Father    • Hypertension Biological Father    • Heart attack Maternal Grandmother    • Heart attack Maternal Grandfather         Social History:  Social History     Socioeconomic History   • Marital status:      Spouse name: None   • Number of children: None   • Years of education: None   • Highest education level: None   Occupational History   • None   Tobacco Use   • Smoking status: Never Smoker   • Smokeless tobacco: Never Used   Vaping Use   • Vaping Use: Never used   Substance and Sexual Activity   • Alcohol use: Yes     Comment: occasional   • Drug use: No   • Sexual activity: Yes     Partners: Male     Birth control/protection: I.U.D.   Other Topics Concern   • None   Social History Narrative   • None     Social Determinants of Health     Financial Resource Strain: Not on file   Food Insecurity: Not on file   Transportation Needs: Not on file   Physical Activity: Not on file   Stress: Not on file   Social Connections: Not on file   Intimate Partner Violence: Not on file   Housing Stability: Not on file           Review of Systems    The rest of her review of systems is completely negative.       Objective   Vitals:    22 0759 22 0802   BP: 100/82 100/84   BP Location: Left upper arm Right  upper arm   Pulse: 72    Resp: 18    SpO2: 99%    Weight: 79.4 kg (175 lb)      Physical Exam  Blood pressure is 100/82 bilaterally.  Her heart rate is 72 and regular.  She is comfortable.  Skin is warm and dry.  Conjunctiva are pink.  Affect is appropriate.  No JVD or HJR.  Carotids are unremarkable.  Chest is clear.  Regular rhythm.  Normal S1 and S2.  I heard no murmurs or gallops.  Abdomen is mildly obese and soft with good bowel sounds.  No organomegaly.  No clubbing, cyanosis, or edema.  Good peripheral pulses.  No rash.  No obvious musculoskeletal deformity.       Labs:   Lab Results   Component Value Date    WBC 4.1 04/16/2021    HGB 14.4 04/16/2021    HCT 41.6 04/16/2021     04/16/2021    CHOL 203 (H) 04/16/2021    TRIG 113 04/16/2021    HDL 37 (L) 04/16/2021    LDLCALC 143 (H) 04/16/2021    ALT 11 04/16/2021    AST 11 04/16/2021     04/16/2021    K 3.9 04/16/2021     04/16/2021    CREATININE 0.64 04/16/2021    BUN 14 04/16/2021    CO2 25 04/16/2021    TSH 0.98 04/16/2021           Cardiac Imaging    TRANSTHORACIC ECHO (TTE) COMPLETE 10/01/2020    Interpretation Summary  · Normal LV systolic function. Estimated EF 60- 65%.  · Normal-sized LV. Normal LV wall thickness.           Problem List Items Addressed This Visit        Circulatory    Palpitation    Relevant Medications    metoprolol succinate XL 25 mg 24 hr tablet      Other Visit Diagnoses     PVC's (premature ventricular contractions)    -  Primary    Relevant Medications    metoprolol succinate XL 25 mg 24 hr tablet          Impression:    I am happy that Yaneli symptoms have significantly improved.  She will stay on the Toprol 25 mg daily.  At this point time I do think I can discharge her back to your care unless any new problems should pop up.  If she should ever want to discontinue the Toprol it should be weaned in an appropriate fashion.    Hoping all is well.    I thank you for allowing me to participate in the care of your  patient.  If I can furnish you with any further details, please do not hesitate to contact me.     Viktoriya Lopez MD  1/6/2022    This document was generated utilizing voice recognition technology. A reasonable attempt at proofreading has been made to minimize errors but please excuse any typographical errors which may be present. Please call with any questions.

## 2022-01-06 NOTE — PROGRESS NOTES
Viktoriya Lopez MD       Reason for visit : Follow up  HPI   Fatoumata De Los Santos is a 36 y.o. female who presents to the office for cardiovascular follow up.      Dear Mari,    On 2022 I saw Yaneli De Los Santos in the office for follow-up.  As you know I originally saw her several months ago with symptomatic PVCs.  She has a structurally normal heart.  Originally she wanted to simply observe and not use any medications.  After several weeks, she felt that the PVCs were really interfering with her life and Toprol 25 mg was started.  Since that time she tells me she is feeling much better with only a rare palpitation.  She denies chest pain, shortness of breath, or lightheadedness.  She has had no side effects from the Toprol.  She did have a stress test that was negative for ischemia.    She also is treated for reflux.  I have reviewed her allergies, social history, medical history, and surgical history.  None of this is changed since I have seen her last other than the above-mentioned.  The rest of her review of systems is completely negative.       Problem List:  2022: PVCs (premature ventricular contractions)  2020-10: Gastroesophageal reflux disease without esophagitis  2020-10: Palpitation           Current Outpatient Medications   Medication Sig   • cetirizine (ZyrTEC) 10 mg tablet Take 10 mg by mouth as needed.     • copper (PARAGARD) 380 square mm intrauterine device intrauterine device 1 each by intrauterine route once.   • metoprolol succinate XL 25 mg 24 hr tablet Take 1 tablet (25 mg total) by mouth once daily.   • pantoprazole (PROTONIX) 40 mg EC tablet      No current facility-administered medications for this visit.          Allergies:  Penicillin g    Surgical History:  Past Surgical History:   Procedure Laterality Date   • APPENDECTOMY     •  SECTION      x2        Family History:  Family History   Problem Relation Age of Onset   • Hyperlipidemia Biological Mother    •  Diabetes Biological Mother    • Osteoporosis Biological Mother    • Heart disease Biological Mother    • Leukemia Biological Father    • Lymphoma Biological Father    • Hypertension Biological Father    • Heart attack Maternal Grandmother    • Heart attack Maternal Grandfather         Social History:  Social History     Socioeconomic History   • Marital status:      Spouse name: None   • Number of children: None   • Years of education: None   • Highest education level: None   Occupational History   • None   Tobacco Use   • Smoking status: Never Smoker   • Smokeless tobacco: Never Used   Vaping Use   • Vaping Use: Never used   Substance and Sexual Activity   • Alcohol use: Yes     Comment: occasional   • Drug use: No   • Sexual activity: Yes     Partners: Male     Birth control/protection: I.U.D.   Other Topics Concern   • None   Social History Narrative   • None     Social Determinants of Health     Financial Resource Strain: Not on file   Food Insecurity: Not on file   Transportation Needs: Not on file   Physical Activity: Not on file   Stress: Not on file   Social Connections: Not on file   Intimate Partner Violence: Not on file   Housing Stability: Not on file           Review of Systems    The rest of her review of systems is completely negative.       Objective   Vitals:    01/06/22 0759 01/06/22 0802   BP: 100/82 100/84   BP Location: Left upper arm Right upper arm   Pulse: 72    Resp: 18    SpO2: 99%    Weight: 79.4 kg (175 lb)      Physical Exam  Blood pressure is 100/82 bilaterally.  Her heart rate is 72 and regular.  She is comfortable.  Skin is warm and dry.  Conjunctiva are pink.  Affect is appropriate.  No JVD or HJR.  Carotids are unremarkable.  Chest is clear.  Regular rhythm.  Normal S1 and S2.  I heard no murmurs or gallops.  Abdomen is mildly obese and soft with good bowel sounds.  No organomegaly.  No clubbing, cyanosis, or edema.  Good peripheral pulses.  No rash.  No obvious  musculoskeletal deformity.       Labs:   Lab Results   Component Value Date    WBC 4.1 04/16/2021    HGB 14.4 04/16/2021    HCT 41.6 04/16/2021     04/16/2021    CHOL 203 (H) 04/16/2021    TRIG 113 04/16/2021    HDL 37 (L) 04/16/2021    LDLCALC 143 (H) 04/16/2021    ALT 11 04/16/2021    AST 11 04/16/2021     04/16/2021    K 3.9 04/16/2021     04/16/2021    CREATININE 0.64 04/16/2021    BUN 14 04/16/2021    CO2 25 04/16/2021    TSH 0.98 04/16/2021           Cardiac Imaging    TRANSTHORACIC ECHO (TTE) COMPLETE 10/01/2020    Interpretation Summary  · Normal LV systolic function. Estimated EF 60- 65%.  · Normal-sized LV. Normal LV wall thickness.           Problem List Items Addressed This Visit        Circulatory    Palpitation    Relevant Medications    metoprolol succinate XL 25 mg 24 hr tablet      Other Visit Diagnoses     PVC's (premature ventricular contractions)    -  Primary    Relevant Medications    metoprolol succinate XL 25 mg 24 hr tablet          Impression:    I am happy that Yaneli symptoms have significantly improved.  She will stay on the Toprol 25 mg daily.  At this point time I do think I can discharge her back to your care unless any new problems should pop up.  If she should ever want to discontinue the Toprol it should be weaned in an appropriate fashion.    Hoping all is well.    I thank you for allowing me to participate in the care of your patient.  If I can furnish you with any further details, please do not hesitate to contact me.     Viktoriya Lopez MD  1/6/2022    This document was generated utilizing voice recognition technology. A reasonable attempt at proofreading has been made to minimize errors but please excuse any typographical errors which may be present. Please call with any questions.

## 2022-02-10 ENCOUNTER — OFFICE VISIT (OUTPATIENT)
Dept: FAMILY MEDICINE | Facility: CLINIC | Age: 37
End: 2022-02-10
Payer: COMMERCIAL

## 2022-02-10 VITALS
HEIGHT: 65 IN | BODY MASS INDEX: 29.49 KG/M2 | HEART RATE: 71 BPM | DIASTOLIC BLOOD PRESSURE: 72 MMHG | OXYGEN SATURATION: 98 % | SYSTOLIC BLOOD PRESSURE: 110 MMHG | WEIGHT: 177 LBS | RESPIRATION RATE: 16 BRPM | TEMPERATURE: 98.2 F

## 2022-02-10 DIAGNOSIS — I49.3 PVCS (PREMATURE VENTRICULAR CONTRACTIONS): ICD-10-CM

## 2022-02-10 DIAGNOSIS — Z00.00 PHYSICAL EXAM: Primary | ICD-10-CM

## 2022-02-10 DIAGNOSIS — K21.9 GASTROESOPHAGEAL REFLUX DISEASE WITHOUT ESOPHAGITIS: ICD-10-CM

## 2022-02-10 PROCEDURE — 3008F BODY MASS INDEX DOCD: CPT | Performed by: FAMILY MEDICINE

## 2022-02-10 PROCEDURE — 99395 PREV VISIT EST AGE 18-39: CPT | Performed by: FAMILY MEDICINE

## 2022-02-10 ASSESSMENT — ENCOUNTER SYMPTOMS
EYE PAIN: 0
COUGH: 0
FATIGUE: 0
WHEEZING: 0
TROUBLE SWALLOWING: 0
ABDOMINAL PAIN: 0
NERVOUS/ANXIOUS: 0
JOINT SWELLING: 0
HEADACHES: 0
VOMITING: 0
BLOOD IN STOOL: 0
DIFFICULTY URINATING: 0
PALPITATIONS: 1
ACTIVITY CHANGE: 0
CONSTIPATION: 0
HEMATURIA: 0
PHOTOPHOBIA: 0
WEAKNESS: 0
EYE DISCHARGE: 0
LIGHT-HEADEDNESS: 0
DIARRHEA: 0
EYE REDNESS: 0
DIZZINESS: 0
SINUS PAIN: 0
RHINORRHEA: 0
CHEST TIGHTNESS: 0
DYSURIA: 0
UNEXPECTED WEIGHT CHANGE: 0
APNEA: 0
SORE THROAT: 0
FREQUENCY: 0
SLEEP DISTURBANCE: 0
NUMBNESS: 0
NECK PAIN: 0
NAUSEA: 0
DYSPHORIC MOOD: 0
SHORTNESS OF BREATH: 0
BACK PAIN: 0
VOICE CHANGE: 0
APPETITE CHANGE: 0
ABDOMINAL DISTENTION: 0

## 2022-02-10 NOTE — PATIENT INSTRUCTIONS
Patient Education     Health Maintenance, Female  Adopting a healthy lifestyle and getting preventive care are important in promoting health and wellness. Ask your health care provider about:  · The right schedule for you to have regular tests and exams.  · Things you can do on your own to prevent diseases and keep yourself healthy.  What should I know about diet, weight, and exercise?  Eat a healthy diet    · Eat a diet that includes plenty of vegetables, fruits, low-fat dairy products, and lean protein.  · Do not eat a lot of foods that are high in solid fats, added sugars, or sodium.  Maintain a healthy weight  Body mass index (BMI) is used to identify weight problems. It estimates body fat based on height and weight. Your health care provider can help determine your BMI and help you achieve or maintain a healthy weight.  Get regular exercise  Get regular exercise. This is one of the most important things you can do for your health. Most adults should:  · Exercise for at least 150 minutes each week. The exercise should increase your heart rate and make you sweat (moderate-intensity exercise).  · Do strengthening exercises at least twice a week. This is in addition to the moderate-intensity exercise.  · Spend less time sitting. Even light physical activity can be beneficial.  Watch cholesterol and blood lipids  Have your blood tested for lipids and cholesterol at 20 years of age, then have this test every 5 years.  Have your cholesterol levels checked more often if:  · Your lipid or cholesterol levels are high.  · You are older than 40 years of age.  · You are at high risk for heart disease.  What should I know about cancer screening?  Depending on your health history and family history, you may need to have cancer screening at various ages. This may include screening for:  · Breast cancer.  · Cervical cancer.  · Colorectal cancer.  · Skin cancer.  · Lung cancer.  What should I know about heart disease,  diabetes, and high blood pressure?  Blood pressure and heart disease  · High blood pressure causes heart disease and increases the risk of stroke. This is more likely to develop in people who have high blood pressure readings, are of  descent, or are overweight.  · Have your blood pressure checked:  ? Every 3-5 years if you are 18-39 years of age.  ? Every year if you are 40 years old or older.  Diabetes  Have regular diabetes screenings. This checks your fasting blood sugar level. Have the screening done:  · Once every three years after age 40 if you are at a normal weight and have a low risk for diabetes.  · More often and at a younger age if you are overweight or have a high risk for diabetes.  What should I know about preventing infection?  Hepatitis B  If you have a higher risk for hepatitis B, you should be screened for this virus. Talk with your health care provider to find out if you are at risk for hepatitis B infection.  Hepatitis C  Testing is recommended for:  · Everyone born from 1945 through 1965.  · Anyone with known risk factors for hepatitis C.  Sexually transmitted infections (STIs)  · Get screened for STIs, including gonorrhea and chlamydia, if:  ? You are sexually active and are younger than 24 years of age.  ? You are older than 24 years of age and your health care provider tells you that you are at risk for this type of infection.  ? Your sexual activity has changed since you were last screened, and you are at increased risk for chlamydia or gonorrhea. Ask your health care provider if you are at risk.  · Ask your health care provider about whether you are at high risk for HIV. Your health care provider may recommend a prescription medicine to help prevent HIV infection. If you choose to take medicine to prevent HIV, you should first get tested for HIV. You should then be tested every 3 months for as long as you are taking the medicine.  Pregnancy  · If you are about to stop having your  period (premenopausal) and you may become pregnant, seek counseling before you get pregnant.  · Take 400 to 800 micrograms (mcg) of folic acid every day if you become pregnant.  · Ask for birth control (contraception) if you want to prevent pregnancy.  Osteoporosis and menopause  Osteoporosis is a disease in which the bones lose minerals and strength with aging. This can result in bone fractures. If you are 65 years old or older, or if you are at risk for osteoporosis and fractures, ask your health care provider if you should:  · Be screened for bone loss.  · Take a calcium or vitamin D supplement to lower your risk of fractures.  · Be given hormone replacement therapy (HRT) to treat symptoms of menopause.  Follow these instructions at home:  Lifestyle  · Do not use any products that contain nicotine or tobacco, such as cigarettes, e-cigarettes, and chewing tobacco. If you need help quitting, ask your health care provider.  · Do not use street drugs.  · Do not share needles.  · Ask your health care provider for help if you need support or information about quitting drugs.  Alcohol use  · Do not drink alcohol if:  ? Your health care provider tells you not to drink.  ? You are pregnant, may be pregnant, or are planning to become pregnant.  · If you drink alcohol:  ? Limit how much you use to 0-1 drink a day.  ? Limit intake if you are breastfeeding.  · Be aware of how much alcohol is in your drink. In the [place] one drink equals one 12 oz bottle of beer (355 mL), one 5 oz glass of wine (148 mL), or one 1½ oz glass of hard liquor (44 mL).  General instructions  · Schedule regular health, dental, and eye exams.  · Stay current with your vaccines.  · Tell your health care provider if:  ? You often feel depressed.  ? You have ever been abused or do not feel safe at home.  Summary  · Adopting a healthy lifestyle and getting preventive care are important in promoting health and wellness.  · Follow your health care provider's  instructions about healthy diet, exercising, and getting tested or screened for diseases.  · Follow your health care provider's instructions on monitoring your cholesterol and blood pressure.  This information is not intended to replace advice given to you by your health care provider. Make sure you discuss any questions you have with your health care provider.  Document Revised: 12/11/2019 Document Reviewed: 12/11/2019  [email or web address] Patient Education © 2021 [employer]

## 2022-02-10 NOTE — PROGRESS NOTES
"      Subjective      Patient ID: Fatoumata De Los Santos is a 36 y.o. female.  1985      HPI  Pt presents for physical exam  PVCs saw Cardiology stable on metoprolol will occas feel one  GERD stable  Sees [facility]  The following have been reviewed and updated as appropriate in this visit:   Tobacco  Allergies  Meds  Problems  Med Hx  Surg Hx  Fam Hx  Soc   Hx      Review of Systems   Constitutional: Negative for activity change, appetite change, fatigue and unexpected weight change.   HENT: Negative for congestion, ear pain, hearing loss, rhinorrhea, sinus pain, sneezing, sore throat, trouble swallowing and voice change.    Eyes: Negative for photophobia, pain, discharge, redness and visual disturbance.   Respiratory: Negative for apnea, cough, chest tightness, shortness of breath and wheezing.    Cardiovascular: Positive for palpitations (occas). Negative for chest pain and leg swelling.   Gastrointestinal: Negative for abdominal distention, abdominal pain, blood in stool, constipation, diarrhea, nausea and vomiting.   Endocrine: Negative for cold intolerance and heat intolerance.   Genitourinary: Negative for decreased urine volume, difficulty urinating, dysuria, frequency, hematuria, urgency, vaginal bleeding, vaginal discharge and vaginal pain.   Musculoskeletal: Negative for back pain, joint swelling and neck pain.   Skin: Negative for rash.   Neurological: Negative for dizziness, weakness, light-headedness, numbness and headaches.   Psychiatric/Behavioral: Negative for dysphoric mood and sleep disturbance. The patient is not nervous/anxious.        Objective     Vitals:    02/10/22 0725   BP: 110/72   BP Location: Left upper arm   Patient Position: Sitting   Pulse: 71   Resp: 16   Temp: 36.8 °C (98.2 °F)   TempSrc: Oral   SpO2: 98%   Weight: 80.3 kg (177 lb)   Height: 1.638 m (5' 4.5\")     Body mass index is 29.91 kg/m².    Physical Exam  Vitals and nursing note reviewed.   Constitutional:  "      Appearance: She is well-developed.   HENT:      Head: Normocephalic.      Right Ear: Tympanic membrane, ear canal and external ear normal.      Left Ear: Tympanic membrane, ear canal and external ear normal.      Nose: Nose normal.   Eyes:      Conjunctiva/sclera: Conjunctivae normal.      Pupils: Pupils are equal, round, and reactive to light.   Neck:      Vascular: No JVD.   Cardiovascular:      Rate and Rhythm: Normal rate and regular rhythm.      Heart sounds: No murmur heard.  Pulmonary:      Effort: Pulmonary effort is normal. No respiratory distress.      Breath sounds: Normal breath sounds. No wheezing.   Abdominal:      General: Bowel sounds are normal. There is no distension.      Palpations: Abdomen is soft. There is no mass.      Tenderness: There is no abdominal tenderness. There is no guarding or rebound.      Hernia: No hernia is present.   Musculoskeletal:         General: No tenderness.   Lymphadenopathy:      Cervical: No cervical adenopathy.   Skin:     General: Skin is warm and dry.   Neurological:      Mental Status: She is alert and oriented to person, place, and time.      Sensory: No sensory deficit.      Deep Tendon Reflexes: Reflexes normal.   Psychiatric:         Mood and Affect: Mood normal.         Behavior: Behavior normal.         Assessment/Plan   Diagnoses and all orders for this visit:    Physical exam (Primary)  Comments:  diet/exercise  check labs    PVCs (premature ventricular contractions)  Assessment & Plan:  Stable on metoprolol        Gastroesophageal reflux disease without esophagitis  Assessment & Plan:  Stable on pantoprazole        Current Outpatient Medications:   •  cetirizine (ZyrTEC) 10 mg tablet, Take 10 mg by mouth as needed.  , Disp: , Rfl:   •  metoprolol succinate XL 25 mg 24 hr tablet, Take 1 tablet (25 mg total) by mouth once daily., Disp: 90 tablet, Rfl: 3  •  pantoprazole (PROTONIX) 40 mg EC tablet, , Disp: , Rfl:   •  copper (PARAGARD) 380 square mm  intrauterine device intrauterine device, 1 each by intrauterine route once., Disp: , Rfl:   Return in about 1 year (around 2/10/2023) for PE.  Patient  agreeable with plan and verbalized understanding

## 2022-05-06 LAB
ALBUMIN SERPL-MCNC: 4.4 G/DL (ref 3.6–5.1)
ALBUMIN/GLOB SERPL: 1.5 (CALC) (ref 1–2.5)
ALP SERPL-CCNC: 36 U/L (ref 31–125)
ALT SERPL-CCNC: 10 U/L (ref 6–29)
AST SERPL-CCNC: 11 U/L (ref 10–30)
BASOPHILS # BLD AUTO: 29 CELLS/UL (ref 0–200)
BASOPHILS NFR BLD AUTO: 0.6 %
BILIRUB SERPL-MCNC: 0.4 MG/DL (ref 0.2–1.2)
BUN SERPL-MCNC: 16 MG/DL (ref 7–25)
BUN/CREAT SERPL: NORMAL (CALC) (ref 6–22)
CALCIUM SERPL-MCNC: 9.3 MG/DL (ref 8.6–10.2)
CHLORIDE SERPL-SCNC: 108 MMOL/L (ref 98–110)
CHOLEST SERPL-MCNC: 167 MG/DL
CHOLEST/HDLC SERPL: 4.8 (CALC)
CO2 SERPL-SCNC: 25 MMOL/L (ref 20–32)
CREAT SERPL-MCNC: 0.66 MG/DL (ref 0.5–1.1)
EOSINOPHIL # BLD AUTO: 113 CELLS/UL (ref 15–500)
EOSINOPHIL NFR BLD AUTO: 2.3 %
ERYTHROCYTE [DISTWIDTH] IN BLOOD BY AUTOMATED COUNT: 13.3 % (ref 11–15)
GLOBULIN SER CALC-MCNC: 3 G/DL (CALC) (ref 1.9–3.7)
GLUCOSE SERPL-MCNC: 95 MG/DL (ref 65–99)
HCT VFR BLD AUTO: 39.1 % (ref 35–45)
HDLC SERPL-MCNC: 35 MG/DL
HGB BLD-MCNC: 13 G/DL (ref 11.7–15.5)
LDLC SERPL CALC-MCNC: 109 MG/DL (CALC)
LYMPHOCYTES # BLD AUTO: 1299 CELLS/UL (ref 850–3900)
LYMPHOCYTES NFR BLD AUTO: 26.5 %
MCH RBC QN AUTO: 28.4 PG (ref 27–33)
MCHC RBC AUTO-ENTMCNC: 33.2 G/DL (ref 32–36)
MCV RBC AUTO: 85.6 FL (ref 80–100)
MONOCYTES # BLD AUTO: 784 CELLS/UL (ref 200–950)
MONOCYTES NFR BLD AUTO: 16 %
NEUTROPHILS # BLD AUTO: 2675 CELLS/UL (ref 1500–7800)
NEUTROPHILS NFR BLD AUTO: 54.6 %
NONHDLC SERPL-MCNC: 132 MG/DL (CALC)
PLATELET # BLD AUTO: 247 THOUSAND/UL (ref 140–400)
PMV BLD REES-ECKER: 9.9 FL (ref 7.5–12.5)
POTASSIUM SERPL-SCNC: 4.4 MMOL/L (ref 3.5–5.3)
PROT SERPL-MCNC: 7.4 G/DL (ref 6.1–8.1)
RBC # BLD AUTO: 4.57 MILLION/UL (ref 3.8–5.1)
SODIUM SERPL-SCNC: 139 MMOL/L (ref 135–146)
TRIGL SERPL-MCNC: 119 MG/DL
TSH SERPL-ACNC: 1.73 MIU/L
WBC # BLD AUTO: 4.9 THOUSAND/UL (ref 3.8–10.8)

## 2023-02-15 ENCOUNTER — OFFICE VISIT (OUTPATIENT)
Dept: FAMILY MEDICINE | Facility: CLINIC | Age: 38
End: 2023-02-15
Payer: COMMERCIAL

## 2023-02-15 VITALS
WEIGHT: 179 LBS | HEART RATE: 58 BPM | HEIGHT: 65 IN | TEMPERATURE: 98.2 F | RESPIRATION RATE: 16 BRPM | BODY MASS INDEX: 29.82 KG/M2 | DIASTOLIC BLOOD PRESSURE: 68 MMHG | OXYGEN SATURATION: 98 % | SYSTOLIC BLOOD PRESSURE: 100 MMHG

## 2023-02-15 DIAGNOSIS — I49.3 PVCS (PREMATURE VENTRICULAR CONTRACTIONS): ICD-10-CM

## 2023-02-15 DIAGNOSIS — Z00.00 PHYSICAL EXAM: Primary | ICD-10-CM

## 2023-02-15 DIAGNOSIS — K21.9 GASTROESOPHAGEAL REFLUX DISEASE WITHOUT ESOPHAGITIS: ICD-10-CM

## 2023-02-15 PROCEDURE — 99395 PREV VISIT EST AGE 18-39: CPT | Performed by: FAMILY MEDICINE

## 2023-02-15 PROCEDURE — 3008F BODY MASS INDEX DOCD: CPT | Performed by: FAMILY MEDICINE

## 2023-02-15 ASSESSMENT — ENCOUNTER SYMPTOMS
DYSPHORIC MOOD: 0
SHORTNESS OF BREATH: 0
VOMITING: 0
WEAKNESS: 0
EYE PAIN: 0
HEADACHES: 0
NUMBNESS: 0
HEMATURIA: 0
EYE REDNESS: 0
APNEA: 0
ABDOMINAL DISTENTION: 0
SLEEP DISTURBANCE: 0
PHOTOPHOBIA: 0
CHEST TIGHTNESS: 0
APPETITE CHANGE: 0
PALPITATIONS: 0
VOICE CHANGE: 0
UNEXPECTED WEIGHT CHANGE: 0
NECK PAIN: 0
JOINT SWELLING: 0
DIFFICULTY URINATING: 0
NAUSEA: 0
ACTIVITY CHANGE: 0
DIZZINESS: 0
WHEEZING: 0
ABDOMINAL PAIN: 0
COUGH: 0
EYE DISCHARGE: 0
SINUS PAIN: 0
BLOOD IN STOOL: 0
LIGHT-HEADEDNESS: 0
NERVOUS/ANXIOUS: 0
BACK PAIN: 0
CONSTIPATION: 0
SORE THROAT: 0
FATIGUE: 0
FREQUENCY: 0
DIARRHEA: 0
DYSURIA: 0
RHINORRHEA: 0
TROUBLE SWALLOWING: 0

## 2023-02-15 NOTE — PROGRESS NOTES
"      Subjective      Patient ID: Fatoumata De Los Santos is a 37 y.o. female.  1985      HPI  Pt presents for physical exam  PVCs on metoprolol  GERD  Stable  Sees gyn  Fell last Monday and sprained left ankle  The following have been reviewed and updated as appropriate in this visit:   Tobacco  Allergies  Meds  Problems  Med Hx  Surg Hx  Fam Hx  Soc   Hx      Review of Systems   Constitutional: Negative for activity change, appetite change, fatigue and unexpected weight change.   HENT: Negative for congestion, ear pain, hearing loss, rhinorrhea, sinus pain, sneezing, sore throat, trouble swallowing and voice change.    Eyes: Negative for photophobia, pain, discharge, redness and visual disturbance.   Respiratory: Negative for apnea, cough, chest tightness, shortness of breath and wheezing.    Cardiovascular: Negative for chest pain, palpitations and leg swelling.   Gastrointestinal: Negative for abdominal distention, abdominal pain, blood in stool, constipation, diarrhea, nausea and vomiting.   Endocrine: Negative for cold intolerance and heat intolerance.   Genitourinary: Negative for decreased urine volume, difficulty urinating, dysuria, frequency, hematuria, urgency, vaginal bleeding, vaginal discharge and vaginal pain.   Musculoskeletal: Negative for back pain, joint swelling and neck pain.   Skin: Negative for rash.   Neurological: Negative for dizziness, weakness, light-headedness, numbness and headaches.   Psychiatric/Behavioral: Negative for dysphoric mood and sleep disturbance. The patient is not nervous/anxious.        Objective     Vitals:    02/15/23 0727   BP: 100/68   BP Location: Left upper arm   Patient Position: Sitting   Pulse: (!) 58   Resp: 16   Temp: 36.8 °C (98.2 °F)   TempSrc: Oral   SpO2: 98%   Weight: 81.2 kg (179 lb)   Height: 1.638 m (5' 4.5\")     Body mass index is 30.25 kg/m².    Physical Exam  Vitals and nursing note reviewed.   Constitutional:       Appearance: She is " well-developed.   HENT:      Head: Normocephalic.      Right Ear: Tympanic membrane, ear canal and external ear normal.      Left Ear: Tympanic membrane, ear canal and external ear normal.      Nose: Nose normal.   Eyes:      Conjunctiva/sclera: Conjunctivae normal.      Pupils: Pupils are equal, round, and reactive to light.   Neck:      Vascular: No JVD.   Cardiovascular:      Rate and Rhythm: Normal rate and regular rhythm.      Heart sounds: No murmur heard.  Pulmonary:      Effort: Pulmonary effort is normal. No respiratory distress.      Breath sounds: Normal breath sounds. No wheezing.   Abdominal:      General: Bowel sounds are normal. There is no distension.      Palpations: Abdomen is soft. There is no mass.      Tenderness: There is no abdominal tenderness. There is no guarding or rebound.      Hernia: No hernia is present.   Musculoskeletal:         General: No tenderness.   Lymphadenopathy:      Cervical: No cervical adenopathy.   Skin:     General: Skin is warm and dry.   Neurological:      Mental Status: She is alert and oriented to person, place, and time.      Sensory: No sensory deficit.      Deep Tendon Reflexes: Reflexes normal.   Psychiatric:         Mood and Affect: Mood normal.         Behavior: Behavior normal.         Assessment/Plan   Diagnoses and all orders for this visit:    Physical exam (Primary)  Comments:  diet/exercise  check labs  up to date on pap  Orders:  -     Lipid panel; Future  -     TSH w reflex FT4; Future  -     Comprehensive metabolic panel; Future  -     CBC and Differential; Future    PVCs (premature ventricular contractions)  Assessment & Plan:  Stable on metoprolol      Gastroesophageal reflux disease without esophagitis  Assessment & Plan:  Stable on pantoprazole      Current Outpatient Medications:   •  cetirizine (ZyrTEC) 10 mg tablet, Take 10 mg by mouth as needed.  , Disp: , Rfl:   •  metoprolol succinate XL 25 mg 24 hr tablet, Take 1 tablet (25 mg total) by  mouth once daily., Disp: 90 tablet, Rfl: 3  •  pantoprazole (PROTONIX) 40 mg EC tablet, , Disp: , Rfl:   •  copper (PARAGARD) 380 square mm intrauterine device intrauterine device, 1 each by intrauterine route once., Disp: , Rfl:   No follow-ups on file.  Patient  agreeable with plan and verbalized understanding

## 2023-03-09 DIAGNOSIS — R00.2 PALPITATION: ICD-10-CM

## 2023-03-09 RX ORDER — METOPROLOL SUCCINATE 25 MG/1
TABLET, EXTENDED RELEASE ORAL
Qty: 90 TABLET | Refills: 3 | Status: SHIPPED | OUTPATIENT
Start: 2023-03-09 | End: 2024-02-16 | Stop reason: SDUPTHER

## 2023-08-15 LAB
ALBUMIN SERPL-MCNC: 4.2 G/DL (ref 3.6–5.1)
ALBUMIN/GLOB SERPL: 1.6 (CALC) (ref 1–2.5)
ALP SERPL-CCNC: 44 U/L (ref 31–125)
ALT SERPL-CCNC: 9 U/L (ref 6–29)
AST SERPL-CCNC: 11 U/L (ref 10–30)
BASOPHILS # BLD AUTO: 40 CELLS/UL (ref 0–200)
BASOPHILS NFR BLD AUTO: 0.7 %
BILIRUB SERPL-MCNC: 0.5 MG/DL (ref 0.2–1.2)
BUN SERPL-MCNC: 13 MG/DL (ref 7–25)
BUN/CREAT SERPL: NORMAL (CALC) (ref 6–22)
CALCIUM SERPL-MCNC: 9.2 MG/DL (ref 8.6–10.2)
CHLORIDE SERPL-SCNC: 108 MMOL/L (ref 98–110)
CHOLEST SERPL-MCNC: 180 MG/DL
CHOLEST/HDLC SERPL: 3.9 (CALC)
CO2 SERPL-SCNC: 24 MMOL/L (ref 20–32)
CREAT SERPL-MCNC: 0.64 MG/DL (ref 0.5–0.97)
EGFRCR SERPLBLD CKD-EPI 2021: 117 ML/MIN/1.73M2
EOSINOPHIL # BLD AUTO: 108 CELLS/UL (ref 15–500)
EOSINOPHIL NFR BLD AUTO: 1.9 %
ERYTHROCYTE [DISTWIDTH] IN BLOOD BY AUTOMATED COUNT: 13 % (ref 11–15)
GLOBULIN SER CALC-MCNC: 2.7 G/DL (CALC) (ref 1.9–3.7)
GLUCOSE SERPL-MCNC: 81 MG/DL (ref 65–99)
HCT VFR BLD AUTO: 39.4 % (ref 35–45)
HDLC SERPL-MCNC: 46 MG/DL
HGB BLD-MCNC: 13.1 G/DL (ref 11.7–15.5)
LDLC SERPL CALC-MCNC: 103 MG/DL (CALC)
LYMPHOCYTES # BLD AUTO: 1573 CELLS/UL (ref 850–3900)
LYMPHOCYTES NFR BLD AUTO: 27.6 %
MCH RBC QN AUTO: 28.6 PG (ref 27–33)
MCHC RBC AUTO-ENTMCNC: 33.2 G/DL (ref 32–36)
MCV RBC AUTO: 86 FL (ref 80–100)
MONOCYTES # BLD AUTO: 450 CELLS/UL (ref 200–950)
MONOCYTES NFR BLD AUTO: 7.9 %
NEUTROPHILS # BLD AUTO: 3528 CELLS/UL (ref 1500–7800)
NEUTROPHILS NFR BLD AUTO: 61.9 %
NONHDLC SERPL-MCNC: 134 MG/DL (CALC)
PLATELET # BLD AUTO: 248 THOUSAND/UL (ref 140–400)
PMV BLD REES-ECKER: 9.5 FL (ref 7.5–12.5)
POTASSIUM SERPL-SCNC: 4.1 MMOL/L (ref 3.5–5.3)
PROT SERPL-MCNC: 6.9 G/DL (ref 6.1–8.1)
RBC # BLD AUTO: 4.58 MILLION/UL (ref 3.8–5.1)
SODIUM SERPL-SCNC: 139 MMOL/L (ref 135–146)
TRIGL SERPL-MCNC: 194 MG/DL
TSH SERPL-ACNC: 1.21 MIU/L
WBC # BLD AUTO: 5.7 THOUSAND/UL (ref 3.8–10.8)

## 2023-08-16 NOTE — RESULT ENCOUNTER NOTE
Fatoumata  Your Triglycerides are increased  watch carbs and fats in diet and we will monitor  No other significant abnormalities on labs  Dr Campa

## 2024-02-16 ENCOUNTER — OFFICE VISIT (OUTPATIENT)
Dept: FAMILY MEDICINE | Facility: CLINIC | Age: 39
End: 2024-02-16
Payer: COMMERCIAL

## 2024-02-16 ENCOUNTER — TELEPHONE (OUTPATIENT)
Dept: FAMILY MEDICINE | Facility: CLINIC | Age: 39
End: 2024-02-16

## 2024-02-16 VITALS
TEMPERATURE: 98.1 F | HEIGHT: 65 IN | SYSTOLIC BLOOD PRESSURE: 108 MMHG | DIASTOLIC BLOOD PRESSURE: 68 MMHG | OXYGEN SATURATION: 99 % | HEART RATE: 61 BPM | WEIGHT: 183.6 LBS | BODY MASS INDEX: 30.59 KG/M2

## 2024-02-16 DIAGNOSIS — K21.9 GASTROESOPHAGEAL REFLUX DISEASE WITHOUT ESOPHAGITIS: ICD-10-CM

## 2024-02-16 DIAGNOSIS — R00.2 PALPITATION: ICD-10-CM

## 2024-02-16 DIAGNOSIS — Z00.00 PHYSICAL EXAM: Primary | ICD-10-CM

## 2024-02-16 PROCEDURE — 3008F BODY MASS INDEX DOCD: CPT | Performed by: FAMILY MEDICINE

## 2024-02-16 PROCEDURE — 99395 PREV VISIT EST AGE 18-39: CPT | Performed by: FAMILY MEDICINE

## 2024-02-16 RX ORDER — METOPROLOL SUCCINATE 25 MG/1
25 TABLET, EXTENDED RELEASE ORAL DAILY
Qty: 90 TABLET | Refills: 3 | Status: SHIPPED | OUTPATIENT
Start: 2024-02-16 | End: 2024-03-04

## 2024-02-16 ASSESSMENT — ENCOUNTER SYMPTOMS
BACK PAIN: 0
LIGHT-HEADEDNESS: 0
NERVOUS/ANXIOUS: 0
HEMATURIA: 0
DIARRHEA: 0
ABDOMINAL PAIN: 0
NECK PAIN: 0
JOINT SWELLING: 0
PHOTOPHOBIA: 0
VOICE CHANGE: 0
SINUS PAIN: 0
ACTIVITY CHANGE: 0
RHINORRHEA: 0
ABDOMINAL DISTENTION: 0
EYE PAIN: 0
BLOOD IN STOOL: 0
EYE REDNESS: 0
WHEEZING: 0
SORE THROAT: 0
APNEA: 0
COUGH: 0
CHEST TIGHTNESS: 0
DYSPHORIC MOOD: 0
SLEEP DISTURBANCE: 0
DIFFICULTY URINATING: 0
SHORTNESS OF BREATH: 0
EYE DISCHARGE: 0
HEADACHES: 0
NUMBNESS: 0
CONSTIPATION: 0
NAUSEA: 0
PALPITATIONS: 0
DIZZINESS: 0
TROUBLE SWALLOWING: 0
VOMITING: 0
FATIGUE: 0
APPETITE CHANGE: 0
UNEXPECTED WEIGHT CHANGE: 0
FREQUENCY: 0
DYSURIA: 0
WEAKNESS: 0

## 2024-02-16 ASSESSMENT — PATIENT HEALTH QUESTIONNAIRE - PHQ9: SUM OF ALL RESPONSES TO PHQ9 QUESTIONS 1 & 2: 0

## 2024-02-16 NOTE — PROGRESS NOTES
"      Subjective      Patient ID: Fatoumata De Los Santos is a 38 y.o. female.  1985      HPI  Pt presents for physical exam  Sees Gyn  And is up to date on pap  Sees [facility]  Palpitations metoprolol helps  Will get if drinks too much coffee or not enough water  The following have been reviewed and updated as appropriate in this visit:   Tobacco  Allergies  Meds  Problems  Med Hx  Surg Hx  Fam Hx  Soc   Hx      Review of Systems   Constitutional: Negative for activity change, appetite change, fatigue and unexpected weight change.   HENT: Negative for congestion, ear pain, hearing loss, rhinorrhea, sinus pain, sneezing, sore throat, trouble swallowing and voice change.    Eyes: Negative for photophobia, pain, discharge, redness and visual disturbance.   Respiratory: Negative for apnea, cough, chest tightness, shortness of breath and wheezing.    Cardiovascular: Negative for chest pain, palpitations and leg swelling.   Gastrointestinal: Negative for abdominal distention, abdominal pain, blood in stool, constipation, diarrhea, nausea and vomiting.   Endocrine: Negative for cold intolerance and heat intolerance.   Genitourinary: Negative for decreased urine volume, difficulty urinating, dysuria, frequency, hematuria, urgency, vaginal bleeding, vaginal discharge and vaginal pain.   Musculoskeletal: Negative for back pain, joint swelling and neck pain.   Skin: Negative for rash.   Neurological: Negative for dizziness, weakness, light-headedness, numbness and headaches.   Psychiatric/Behavioral: Negative for dysphoric mood and sleep disturbance. The patient is not nervous/anxious.        Objective     Vitals:    02/16/24 0725   BP: 108/68   BP Location: Left upper arm   Pulse: 61   Temp: 36.7 °C (98.1 °F)   SpO2: 99%   Weight: 83.3 kg (183 lb 9.6 oz)   Height: 1.645 m (5' 4.75\")     Body mass index is 30.79 kg/m².    Physical Exam  Vitals and nursing note reviewed.   Constitutional:       Appearance: " She is well-developed.   HENT:      Head: Normocephalic.      Right Ear: Tympanic membrane, ear canal and external ear normal.      Left Ear: Tympanic membrane, ear canal and external ear normal.      Nose: Nose normal.   Eyes:      Conjunctiva/sclera: Conjunctivae normal.      Pupils: Pupils are equal, round, and reactive to light.   Neck:      Vascular: No JVD.   Cardiovascular:      Rate and Rhythm: Normal rate and regular rhythm.      Heart sounds: No murmur heard.  Pulmonary:      Effort: Pulmonary effort is normal. No respiratory distress.      Breath sounds: Normal breath sounds. No wheezing.   Abdominal:      General: Bowel sounds are normal. There is no distension.      Palpations: Abdomen is soft. There is no mass.      Tenderness: There is no abdominal tenderness. There is no guarding or rebound.      Hernia: No hernia is present.   Musculoskeletal:         General: No tenderness.   Lymphadenopathy:      Cervical: No cervical adenopathy.   Skin:     General: Skin is warm and dry.   Neurological:      Mental Status: She is alert and oriented to person, place, and time.      Sensory: No sensory deficit.      Deep Tendon Reflexes: Reflexes normal.   Psychiatric:         Mood and Affect: Mood normal.         Behavior: Behavior normal.         Assessment/Plan   Diagnoses and all orders for this visit:    Physical exam (Primary)  Comments:  diet/exercise  check labs  up to date on pap will get concepción  Orders:  -     TSH w reflex FT4; Future  -     Lipid panel; Future  -     Comprehensive metabolic panel; Future  -     CBC and Differential; Future    Palpitation  Assessment & Plan:  Stable on metoprolol    Orders:  -     metoprolol succinate XL (TOPROL-XL) 25 mg 24 hr tablet; Take 1 tablet (25 mg total) by mouth daily.    Gastroesophageal reflux disease without esophagitis  Assessment & Plan:  Continue pantoprazole        Current Outpatient Medications:   •  metoprolol succinate XL (TOPROL-XL) 25 mg 24 hr tablet,  Take 1 tablet (25 mg total) by mouth daily., Disp: 90 tablet, Rfl: 3  •  cetirizine (ZyrTEC) 10 mg tablet, Take 10 mg by mouth as needed.  , Disp: , Rfl:   •  copper (PARAGARD) 380 square mm intrauterine device intrauterine device, 1 each by intrauterine route once., Disp: , Rfl:   •  pantoprazole (PROTONIX) 40 mg EC tablet, Take 1 tablet (40 mg total) by mouth daily., Disp: 90 tablet, Rfl: 1  Return in about 1 year (around 2/16/2025) for PE.  Patient  agreeable with plan and verbalized understanding

## 2024-02-16 NOTE — TELEPHONE ENCOUNTER
Team Member Role and Specialty Contact Info Address Start End Comments   Theresa Peterson CRNP Referring Physician (Obstetrics and Gynecology) Phone: [phone] Fax: [phone]  [address] [place] [address] 2/16/2024 - -         [facility]      Please get concepción

## 2024-03-04 DIAGNOSIS — R00.2 PALPITATION: ICD-10-CM

## 2024-03-04 RX ORDER — METOPROLOL SUCCINATE 25 MG/1
25 TABLET, EXTENDED RELEASE ORAL DAILY
Qty: 90 TABLET | Refills: 3 | Status: SHIPPED | OUTPATIENT
Start: 2024-03-04 | End: 2025-02-23

## 2024-03-04 NOTE — TELEPHONE ENCOUNTER
"Medicine Refill Request    Last Office Visit: 2/16/2024   Last Consult Visit: Visit date not found  Last Telemedicine Visit: Visit date not found    Next Appointment: 2/21/2025      Current Outpatient Medications:   •  cetirizine (ZyrTEC) 10 mg tablet, Take 10 mg by mouth as needed.  , Disp: , Rfl:   •  copper (PARAGARD) 380 square mm intrauterine device intrauterine device, 1 each by intrauterine route once., Disp: , Rfl:   •  metoprolol succinate XL (TOPROL-XL) 25 mg 24 hr tablet, Take 1 tablet (25 mg total) by mouth daily., Disp: 90 tablet, Rfl: 3  •  pantoprazole (PROTONIX) 40 mg EC tablet, Take 1 tablet (40 mg total) by mouth daily., Disp: 90 tablet, Rfl: 1      BP Readings from Last 3 Encounters:   02/16/24 108/68   02/15/23 100/68   02/10/22 110/72       Recent Lab results:  Lab Results   Component Value Date    CHOL 180 08/15/2023   ,   Lab Results   Component Value Date    HDL 46 (L) 08/15/2023   ,   Lab Results   Component Value Date    LDLCALC 103 (H) 08/15/2023   ,   Lab Results   Component Value Date    TRIG 194 (H) 08/15/2023        Lab Results   Component Value Date    GLUCOSE 81 08/15/2023   , No results found for: \"HGBA1C\"      Lab Results   Component Value Date    CREATININE 0.64 08/15/2023       Lab Results   Component Value Date    TSH 1.21 08/15/2023         No results found for: \"HGBA1C\"  "

## 2024-05-04 LAB
ALBUMIN SERPL-MCNC: 4.1 G/DL (ref 3.6–5.1)
ALBUMIN/GLOB SERPL: 1.4 (CALC) (ref 1–2.5)
ALP SERPL-CCNC: 38 U/L (ref 31–125)
ALT SERPL-CCNC: 10 U/L (ref 6–29)
AST SERPL-CCNC: 10 U/L (ref 10–30)
BASOPHILS # BLD AUTO: 18 CELLS/UL (ref 0–200)
BASOPHILS NFR BLD AUTO: 0.4 %
BILIRUB SERPL-MCNC: 0.6 MG/DL (ref 0.2–1.2)
BUN SERPL-MCNC: 19 MG/DL (ref 7–25)
BUN/CREAT SERPL: NORMAL (CALC) (ref 6–22)
CALCIUM SERPL-MCNC: 9.2 MG/DL (ref 8.6–10.2)
CHLORIDE SERPL-SCNC: 107 MMOL/L (ref 98–110)
CHOLEST SERPL-MCNC: 184 MG/DL
CHOLEST/HDLC SERPL: 5 (CALC)
CO2 SERPL-SCNC: 28 MMOL/L (ref 20–32)
CREAT SERPL-MCNC: 0.67 MG/DL (ref 0.5–0.97)
EGFRCR SERPLBLD CKD-EPI 2021: 115 ML/MIN/1.73M2
EOSINOPHIL # BLD AUTO: 81 CELLS/UL (ref 15–500)
EOSINOPHIL NFR BLD AUTO: 1.8 %
ERYTHROCYTE [DISTWIDTH] IN BLOOD BY AUTOMATED COUNT: 12.7 % (ref 11–15)
GLOBULIN SER CALC-MCNC: 2.9 G/DL (CALC) (ref 1.9–3.7)
GLUCOSE SERPL-MCNC: 84 MG/DL (ref 65–99)
HCT VFR BLD AUTO: 42 % (ref 35–45)
HDLC SERPL-MCNC: 37 MG/DL
HGB BLD-MCNC: 13.6 G/DL (ref 11.7–15.5)
LDLC SERPL CALC-MCNC: 125 MG/DL (CALC)
LYMPHOCYTES # BLD AUTO: 1319 CELLS/UL (ref 850–3900)
LYMPHOCYTES NFR BLD AUTO: 29.3 %
MCH RBC QN AUTO: 29 PG (ref 27–33)
MCHC RBC AUTO-ENTMCNC: 32.4 G/DL (ref 32–36)
MCV RBC AUTO: 89.6 FL (ref 80–100)
MONOCYTES # BLD AUTO: 360 CELLS/UL (ref 200–950)
MONOCYTES NFR BLD AUTO: 8 %
NEUTROPHILS # BLD AUTO: 2723 CELLS/UL (ref 1500–7800)
NEUTROPHILS NFR BLD AUTO: 60.5 %
NONHDLC SERPL-MCNC: 147 MG/DL (CALC)
PLATELET # BLD AUTO: 265 THOUSAND/UL (ref 140–400)
PMV BLD REES-ECKER: 9.1 FL (ref 7.5–12.5)
POTASSIUM SERPL-SCNC: 4 MMOL/L (ref 3.5–5.3)
PROT SERPL-MCNC: 7 G/DL (ref 6.1–8.1)
RBC # BLD AUTO: 4.69 MILLION/UL (ref 3.8–5.1)
SODIUM SERPL-SCNC: 140 MMOL/L (ref 135–146)
TRIGL SERPL-MCNC: 111 MG/DL
TSH SERPL-ACNC: 1.21 MIU/L
WBC # BLD AUTO: 4.5 THOUSAND/UL (ref 3.8–10.8)

## 2024-05-06 NOTE — RESULT ENCOUNTER NOTE
Fatoumata  Your cholesterol is slightly  elevated watch fats in diet and stay active and we will recheck at your physical next year  No other significant abnormalities  Dr Campa

## 2024-06-17 RX ORDER — PANTOPRAZOLE SODIUM 40 MG/1
40 TABLET, DELAYED RELEASE ORAL DAILY
Qty: 90 TABLET | Refills: 1 | Status: SHIPPED | OUTPATIENT
Start: 2024-06-17 | End: 2024-11-24

## 2024-11-24 RX ORDER — PANTOPRAZOLE SODIUM 40 MG/1
40 TABLET, DELAYED RELEASE ORAL DAILY
Qty: 90 TABLET | Refills: 1 | Status: SHIPPED | OUTPATIENT
Start: 2024-11-24

## 2025-02-23 DIAGNOSIS — R00.2 PALPITATION: ICD-10-CM

## 2025-02-23 RX ORDER — METOPROLOL SUCCINATE 25 MG/1
25 TABLET, EXTENDED RELEASE ORAL DAILY
Qty: 90 TABLET | Refills: 3 | Status: SHIPPED | OUTPATIENT
Start: 2025-02-23

## 2025-02-27 ENCOUNTER — OFFICE VISIT (OUTPATIENT)
Dept: FAMILY MEDICINE | Facility: CLINIC | Age: 40
End: 2025-02-27
Payer: COMMERCIAL

## 2025-02-27 VITALS
WEIGHT: 181 LBS | SYSTOLIC BLOOD PRESSURE: 104 MMHG | TEMPERATURE: 98.3 F | HEIGHT: 65 IN | RESPIRATION RATE: 16 BRPM | OXYGEN SATURATION: 98 % | BODY MASS INDEX: 30.16 KG/M2 | HEART RATE: 67 BPM | DIASTOLIC BLOOD PRESSURE: 70 MMHG

## 2025-02-27 DIAGNOSIS — R00.2 PALPITATION: ICD-10-CM

## 2025-02-27 DIAGNOSIS — K21.9 GASTROESOPHAGEAL REFLUX DISEASE WITHOUT ESOPHAGITIS: ICD-10-CM

## 2025-02-27 DIAGNOSIS — Z00.00 PHYSICAL EXAM: Primary | ICD-10-CM

## 2025-02-27 PROCEDURE — 3008F BODY MASS INDEX DOCD: CPT | Performed by: FAMILY MEDICINE

## 2025-02-27 PROCEDURE — 99395 PREV VISIT EST AGE 18-39: CPT | Performed by: FAMILY MEDICINE

## 2025-02-27 SDOH — ECONOMIC STABILITY: INCOME INSECURITY: IN THE LAST 12 MONTHS, WAS THERE A TIME WHEN YOU WERE NOT ABLE TO PAY THE MORTGAGE OR RENT ON TIME?: NO

## 2025-02-27 SDOH — ECONOMIC STABILITY: FOOD INSECURITY: WITHIN THE PAST 12 MONTHS, YOU WORRIED THAT YOUR FOOD WOULD RUN OUT BEFORE YOU GOT MONEY TO BUY MORE.: NEVER TRUE

## 2025-02-27 SDOH — ECONOMIC STABILITY: FOOD INSECURITY: WITHIN THE PAST 12 MONTHS, THE FOOD YOU BOUGHT JUST DIDN'T LAST AND YOU DIDN'T HAVE MONEY TO GET MORE.: NEVER TRUE

## 2025-02-27 SDOH — ECONOMIC STABILITY: TRANSPORTATION INSECURITY
IN THE PAST 12 MONTHS, HAS LACK OF TRANSPORTATION KEPT YOU FROM MEETINGS, WORK, OR FROM GETTING THINGS NEEDED FOR DAILY LIVING?: NO

## 2025-02-27 SDOH — ECONOMIC STABILITY: TRANSPORTATION INSECURITY
IN THE PAST 12 MONTHS, HAS THE LACK OF TRANSPORTATION KEPT YOU FROM MEDICAL APPOINTMENTS OR FROM GETTING MEDICATIONS?: NO

## 2025-02-27 ASSESSMENT — ENCOUNTER SYMPTOMS
DYSURIA: 0
SHORTNESS OF BREATH: 0
APNEA: 0
EYE PAIN: 0
PALPITATIONS: 0
NAUSEA: 0
ABDOMINAL DISTENTION: 0
BACK PAIN: 0
JOINT SWELLING: 0
LIGHT-HEADEDNESS: 0
UNEXPECTED WEIGHT CHANGE: 0
FREQUENCY: 0
COUGH: 0
TROUBLE SWALLOWING: 0
CONSTIPATION: 0
NECK PAIN: 0
FATIGUE: 0
RHINORRHEA: 0
DIFFICULTY URINATING: 0
CHEST TIGHTNESS: 0
DIARRHEA: 0
VOMITING: 0
SINUS PAIN: 0
HEADACHES: 0
BLOOD IN STOOL: 0
DIZZINESS: 0
NERVOUS/ANXIOUS: 0
HEMATURIA: 0
SORE THROAT: 0
PHOTOPHOBIA: 0
EYE REDNESS: 0
DYSPHORIC MOOD: 0
WEAKNESS: 0
APPETITE CHANGE: 0
VOICE CHANGE: 0
EYE DISCHARGE: 0
SLEEP DISTURBANCE: 0
ABDOMINAL PAIN: 0
NUMBNESS: 0
ACTIVITY CHANGE: 0
WHEEZING: 0

## 2025-02-27 ASSESSMENT — PATIENT HEALTH QUESTIONNAIRE - PHQ9: SUM OF ALL RESPONSES TO PHQ9 QUESTIONS 1 & 2: 0

## 2025-02-27 ASSESSMENT — SOCIAL DETERMINANTS OF HEALTH (SDOH): IN THE PAST 12 MONTHS, HAS THE ELECTRIC, GAS, OIL, OR WATER COMPANY THREATENED TO SHUT OFF SERVICE IN YOUR HOME?: NO

## 2025-02-27 NOTE — PROGRESS NOTES
"Subjective      Patient ID: Fatoumata De Los Santos is a 39 y.o. female.  1985      HPI  Pt presents for physical exam  Palpitations   occas but metoprolol  GERD stable  Staying active  The following have been reviewed and updated as appropriate in this visit:   Allergies  Meds  Problems  Med Hx  Surg Hx  Fam Hx       Review of Systems   Constitutional:  Negative for activity change, appetite change, fatigue and unexpected weight change.   HENT:  Negative for congestion, ear pain, hearing loss, rhinorrhea, sinus pain, sneezing, sore throat, trouble swallowing and voice change.    Eyes:  Negative for photophobia, pain, discharge, redness and visual disturbance.   Respiratory:  Negative for apnea, cough, chest tightness, shortness of breath and wheezing.    Cardiovascular:  Negative for chest pain, palpitations and leg swelling.   Gastrointestinal:  Negative for abdominal distention, abdominal pain, blood in stool, constipation, diarrhea, nausea and vomiting.   Endocrine: Negative for cold intolerance and heat intolerance.   Genitourinary:  Negative for decreased urine volume, difficulty urinating, dysuria, frequency, hematuria, urgency, vaginal bleeding, vaginal discharge and vaginal pain.   Musculoskeletal:  Negative for back pain, joint swelling and neck pain.   Skin:  Negative for rash.   Neurological:  Negative for dizziness, weakness, light-headedness, numbness and headaches.   Psychiatric/Behavioral:  Negative for dysphoric mood and sleep disturbance. The patient is not nervous/anxious.        Objective     Vitals:    02/27/25 0850   BP: 104/70   BP Location: Right upper arm   Patient Position: Sitting   Pulse: 67   Resp: 16   Temp: 36.8 °C (98.3 °F)   TempSrc: Oral   SpO2: 98%   Weight: 82.1 kg (181 lb)   Height: 1.651 m (5' 5\")     Body mass index is 30.12 kg/m².    Physical Exam  Vitals and nursing note reviewed.   Constitutional:       Appearance: She is well-developed.   HENT:      Head: " Normocephalic.      Right Ear: Tympanic membrane, ear canal and external ear normal.      Left Ear: Tympanic membrane, ear canal and external ear normal.      Nose: Nose normal.   Eyes:      Conjunctiva/sclera: Conjunctivae normal.      Pupils: Pupils are equal, round, and reactive to light.   Neck:      Vascular: No JVD.   Cardiovascular:      Rate and Rhythm: Normal rate and regular rhythm.      Heart sounds: No murmur heard.  Pulmonary:      Effort: Pulmonary effort is normal. No respiratory distress.      Breath sounds: Normal breath sounds. No wheezing.   Abdominal:      General: Bowel sounds are normal. There is no distension.      Palpations: Abdomen is soft. There is no mass.      Tenderness: There is no abdominal tenderness. There is no guarding or rebound.      Hernia: No hernia is present.   Musculoskeletal:         General: No tenderness.   Lymphadenopathy:      Cervical: No cervical adenopathy.   Skin:     General: Skin is warm and dry.   Neurological:      Mental Status: She is alert and oriented to person, place, and time.      Sensory: No sensory deficit.      Deep Tendon Reflexes: Reflexes normal.   Psychiatric:         Mood and Affect: Mood normal.         Behavior: Behavior normal.         Assessment & Plan  Physical exam  Diet/exercise  Check labs   Up to date on pap       Palpitation  Continue metoprolol         Gastroesophageal reflux disease without esophagitis  Continue pantoprazole           Current Outpatient Medications   Medication Instructions    cetirizine (ZYRTEC) 10 mg, As needed    copper (PARAGARD) 380 square mm intrauterine device intrauterine device 1 each, Once    metoprolol succinate XL (TOPROL-XL) 25 mg, oral, Daily    pantoprazole (PROTONIX) 40 mg, oral, Daily     Return in about 1 year (around 2/27/2026) for PE.  Patient  agreeable with plan and verbalized understanding

## 2025-05-19 DIAGNOSIS — K21.9 GASTROESOPHAGEAL REFLUX DISEASE WITHOUT ESOPHAGITIS: Primary | ICD-10-CM

## 2025-05-20 RX ORDER — PANTOPRAZOLE SODIUM 40 MG/1
40 TABLET, DELAYED RELEASE ORAL DAILY
Qty: 90 TABLET | Refills: 1 | Status: SHIPPED | OUTPATIENT
Start: 2025-05-20